# Patient Record
Sex: FEMALE | Race: WHITE | NOT HISPANIC OR LATINO | Employment: FULL TIME | URBAN - METROPOLITAN AREA
[De-identification: names, ages, dates, MRNs, and addresses within clinical notes are randomized per-mention and may not be internally consistent; named-entity substitution may affect disease eponyms.]

---

## 2017-05-30 RX ORDER — NORGESTIMATE AND ETHINYL ESTRADIOL 0.25-0.035
KIT ORAL
Qty: 28 TABLET | Refills: 2 | Status: SHIPPED | OUTPATIENT
Start: 2017-05-30 | End: 2017-07-24 | Stop reason: SDUPTHER

## 2017-07-24 ENCOUNTER — OFFICE VISIT (OUTPATIENT)
Dept: OBSTETRICS AND GYNECOLOGY | Age: 27
End: 2017-07-24

## 2017-07-24 VITALS
WEIGHT: 141 LBS | HEIGHT: 66 IN | SYSTOLIC BLOOD PRESSURE: 112 MMHG | DIASTOLIC BLOOD PRESSURE: 70 MMHG | BODY MASS INDEX: 22.66 KG/M2

## 2017-07-24 DIAGNOSIS — Z01.419 ENCOUNTER FOR GYNECOLOGICAL EXAMINATION WITHOUT ABNORMAL FINDING: Primary | ICD-10-CM

## 2017-07-24 PROCEDURE — 99395 PREV VISIT EST AGE 18-39: CPT | Performed by: OBSTETRICS & GYNECOLOGY

## 2017-07-24 RX ORDER — NORGESTIMATE AND ETHINYL ESTRADIOL 0.25-0.035
1 KIT ORAL DAILY
Qty: 90 TABLET | Refills: 3 | Status: SHIPPED | OUTPATIENT
Start: 2017-07-24 | End: 2018-07-05 | Stop reason: SDUPTHER

## 2017-07-24 RX ORDER — FEXOFENADINE HCL 180 MG/1
TABLET ORAL
Refills: 2 | COMMUNITY
Start: 2017-07-18 | End: 2020-01-23

## 2017-07-24 NOTE — PROGRESS NOTES
"Subjective     Chief Complaint   Patient presents with   • Gynecologic Exam     AC       History of Present Illness    Mo Escobar is a 27 y.o.  who presents for annual exam.  [employer] at [employer]. Graduates in 1 year.   Her menses are regular every 28-30 days, lasting 4-7 days, dysmenorrhea none   Obstetric History:  OB History      Para Term  AB TAB SAB Ectopic Multiple Living    0 0 0 0 0 0 0 0 0 0         Menstrual History:     Patient's last menstrual period was 2017.         Current contraception: OCP (estrogen/progesterone)  History of abnormal Pap smear: no  Received Gardasil immunization: all 3  Perform regular self breast exam: no  Family history of uterine or ovarian cancer: yes - MGM ovarian 50, Pt's mom has neg BRCA   Family History of colon cancer: no  Family history of breast cancer: yes - MGM 50     Mammogram: not indicated.  Colonoscopy: not indicated.  DEXA: not indicated.    Exercise: moderately active  Calcium/Vitamin D: adequate intake    The following portions of the patient's history were reviewed and updated as appropriate: allergies, current medications, past family history, past medical history, past social history, past surgical history and problem list.    Review of Systems    Review of Systems   Constitutional: Negative for fatigue.   Respiratory: Negative for shortness of breath.    Gastrointestinal: Negative for abdominal pain.   Genitourinary: Negative for dysuria.   Neurological: Negative for headaches.   Psychiatric/Behavioral: Negative for dysphoric mood.         Objective   Physical Exam    /70  Ht 66\" (167.6 cm)  Wt 141 lb (64 kg)  LMP 2017  BMI 22.76 kg/m2    General:   alert, appears stated age and cooperative   Neck: thyroid normal to palpation   Heart: regular rate and rhythm   Lungs: clear to auscultation bilaterally   Abdomen: soft, non-tender, without masses or organomegaly   Breast: inspection negative, no nipple discharge " or bleeding, no masses or nodularity palpable   Vulva: normal, Bartholin's, Urethra, [place]'s normal   Vagina: normal mucosa, normal discharge   Cervix: no cervical motion tenderness and no lesions   Uterus: mobile, non-tender, normal shape and consistency   Adnexa: no mass, fullness, tenderness   Rectal: not indicated     Assessment/Plan   Mo was seen today for gynecologic exam.    Diagnoses and all orders for this visit:    Encounter for gynecological examination without abnormal finding    Other orders  -     norgestimate-ethinyl estradiol (MONONESSA) 0.25-35 MG-MCG per tablet; Take 1 tablet by mouth Daily.      All questions answered.  Breast self exam technique reviewed and patient encouraged to perform self-exam monthly.  Discussed healthy lifestyle modifications.  Recommended 30 minutes of aerobic exercise five times per week.  Discussed calcium needs to prevent osteoporosis.

## 2017-07-26 LAB
CONV .: NORMAL
CYTOLOGIST CVX/VAG CYTO: NORMAL
CYTOLOGY CVX/VAG DOC THIN PREP: NORMAL
DX ICD CODE: NORMAL
HIV 1 & 2 AB SER-IMP: NORMAL
OTHER STN SPEC: NORMAL
PATH REPORT.FINAL DX SPEC: NORMAL
STAT OF ADQ CVX/VAG CYTO-IMP: NORMAL

## 2017-07-27 ENCOUNTER — TELEPHONE (OUTPATIENT)
Dept: OBSTETRICS AND GYNECOLOGY | Age: 27
End: 2017-07-27

## 2018-07-06 RX ORDER — NORGESTIMATE AND ETHINYL ESTRADIOL 0.25-0.035
1 KIT ORAL DAILY
Qty: 84 TABLET | Refills: 0 | Status: SHIPPED | OUTPATIENT
Start: 2018-07-06 | End: 2018-09-17 | Stop reason: SDUPTHER

## 2018-09-17 RX ORDER — NORGESTIMATE AND ETHINYL ESTRADIOL 0.25-0.035
1 KIT ORAL DAILY
Qty: 84 TABLET | Refills: 0 | Status: SHIPPED | OUTPATIENT
Start: 2018-09-17 | End: 2018-12-11 | Stop reason: SDUPTHER

## 2018-12-11 RX ORDER — NORGESTIMATE AND ETHINYL ESTRADIOL 0.25-0.035
KIT ORAL
Qty: 84 TABLET | Refills: 0 | Status: SHIPPED | OUTPATIENT
Start: 2018-12-11 | End: 2018-12-14 | Stop reason: ALTCHOICE

## 2018-12-14 ENCOUNTER — OFFICE VISIT (OUTPATIENT)
Dept: OBSTETRICS AND GYNECOLOGY | Age: 28
End: 2018-12-14

## 2018-12-14 VITALS
DIASTOLIC BLOOD PRESSURE: 70 MMHG | BODY MASS INDEX: 23.95 KG/M2 | WEIGHT: 149 LBS | SYSTOLIC BLOOD PRESSURE: 108 MMHG | HEIGHT: 66 IN

## 2018-12-14 DIAGNOSIS — Z01.419 ENCOUNTER FOR GYNECOLOGICAL EXAMINATION WITHOUT ABNORMAL FINDING: Primary | ICD-10-CM

## 2018-12-14 DIAGNOSIS — Z12.4 ROUTINE CERVICAL SMEAR: ICD-10-CM

## 2018-12-14 PROBLEM — G43.909 MIGRAINE: Status: ACTIVE | Noted: 2018-12-14

## 2018-12-14 PROCEDURE — 99395 PREV VISIT EST AGE 18-39: CPT | Performed by: OBSTETRICS & GYNECOLOGY

## 2018-12-14 RX ORDER — SUMATRIPTAN 25 MG/1
TABLET, FILM COATED ORAL
Qty: 15 TABLET | Refills: 4 | Status: SHIPPED | OUTPATIENT
Start: 2018-12-14 | End: 2020-03-19

## 2018-12-14 RX ORDER — NORGESTIMATE AND ETHINYL ESTRADIOL 0.25-0.035
1 KIT ORAL DAILY
Qty: 84 TABLET | Refills: 3 | Status: CANCELLED | OUTPATIENT
Start: 2018-12-14

## 2018-12-14 RX ORDER — NORETHINDRONE ACETATE AND ETHINYL ESTRADIOL, AND FERROUS FUMARATE 1MG-20(24)
1 KIT ORAL DAILY
Qty: 28 CAPSULE | Refills: 11 | Status: SHIPPED | OUTPATIENT
Start: 2018-12-14 | End: 2019-09-27 | Stop reason: ALTCHOICE

## 2018-12-14 NOTE — PROGRESS NOTES
"Subjective     Chief Complaint   Patient presents with   • Gynecologic Exam     AC       History of Present Illness    Mo Castro is a 28 y.o.  who presents for annual exam.  She is now working as a  in each town.  She also got  in the past few months.  She is not planning on children anytime soon.  She has noticed that she has increased stress with work and has noticed migraines when she is on her menses.    Her menses are regular every 28-30 days, lasting 4-7 days, dysmenorrhea mild, occurring first 1-2 days of flow   Obstetric History:  OB History      Para Term  AB Living    0 0 0 0 0 0    SAB TAB Ectopic Molar Multiple Live Births    0 0 0   0           Menstrual History:     Patient's last menstrual period was 2018.         Current contraception: OCP (estrogen/progesterone)  History of abnormal Pap smear: no  Received Gardasil immunization: yes  Perform regular self breast exam: no  Family history of uterine or ovarian cancer: yes - ovarian MGM 50 Pt's mom  Genetic testing was negative   Family History of colon cancer: no  Family history of breast cancer: yes - MGM 50     Mammogram: not indicated.  Colonoscopy: not indicated.  DEXA: not indicated.    Exercise: exercises 7 times a week  Calcium/Vitamin D: inadequate intake    The following portions of the patient's history were reviewed and updated as appropriate: allergies, current medications, past family history, past medical history, past social history, past surgical history and problem list.    Review of Systems    Review of Systems   Constitutional: Negative for fatigue.   Respiratory: Negative for shortness of breath.    Gastrointestinal: Negative for abdominal pain.   Genitourinary: Negative for dysuria.   Neurological: Negative for headaches.   Psychiatric/Behavioral: Negative for dysphoric mood.         Objective   Physical Exam    /70   Ht 167.6 cm (66\")   Wt 67.6 kg (149 lb)   LMP 2018  "  BMI 24.05 kg/m²     General:   alert, appears stated age and cooperative   Neck: thyroid normal to palpation   Heart: regular rate and rhythm   Lungs: clear to auscultation bilaterally   Abdomen: soft, non-tender, without masses or organomegaly   Breast: inspection negative, no nipple discharge or bleeding, no masses or nodularity palpable   Vulva: normal, Bartholin's, Urethra, [place]'s normal   Vagina: normal mucosa, normal discharge   Cervix: no cervical motion tenderness and no lesions   Uterus: mobile, non-tender, normal shape and consistency   Adnexa: no mass, fullness, tenderness   Rectal: not indicated     Assessment/Plan   Mo was seen today for gynecologic exam.    Diagnoses and all orders for this visit:    Encounter for gynecological examination without abnormal finding  -     IGP,rfx Aptima HPV All Pth    Routine cervical smear  -     IGP,rfx Aptima HPV All Pth    Other orders  -     Cancel: norgestimate-ethinyl estradiol (ESTARYLLA) 0.25-35 MG-MCG per tablet; Take 1 tablet by mouth Daily.  -     TAYTULLA 1-20 MG-MCG(24) capsule; Take 1 capsule by mouth Daily.  -     SUMAtriptan (IMITREX) 25 MG tablet; Take one tablet at onset of headache. May repeat dose one time in 2 hours if headache not relieved.      Discussed menstrual migraines.  We discussed multiple treatments.  Patient will try a change to a lower dose OCP with a 4 day placebo week.  She will also try Imitrex.  I discussed that she should consider nonestrogen containing contraception.  We also discussed stress management.  All questions answered.  Breast self exam technique reviewed and patient encouraged to perform self-exam monthly.  Discussed healthy lifestyle modifications.  Recommended 30 minutes of aerobic exercise five times per week.  Discussed calcium needs to prevent osteoporosis.

## 2018-12-18 ENCOUNTER — TELEPHONE (OUTPATIENT)
Dept: OBSTETRICS AND GYNECOLOGY | Age: 28
End: 2018-12-18

## 2018-12-18 NOTE — TELEPHONE ENCOUNTER
----- Message from Martin Orta MD sent at 12/18/2018  8:05 AM EST -----  Please notify pap is normal.

## 2019-02-19 ENCOUNTER — TELEPHONE (OUTPATIENT)
Dept: OBSTETRICS AND GYNECOLOGY | Age: 29
End: 2019-02-19

## 2019-02-19 NOTE — TELEPHONE ENCOUNTER
Pt states that ever since she started on her new BC Taytulla she has had 3 UTI's. She began taking it about a month ago. Wants to know if the BC could be contributing to that. Please advise

## 2019-02-19 NOTE — TELEPHONE ENCOUNTER
Pt has been going to urgent care for her UTI's due to her living in [facility]. The Dr at the urgent care has referred her to see a urologist. She has appt scheduled.

## 2019-02-19 NOTE — TELEPHONE ENCOUNTER
Please notify that the oral contraceptive pills are not linked to UTIs.  Recommend emptying bladder after intercourse and patient should have a culture of her urine to make sure the appropriate antibiotics are being used.

## 2019-07-13 ENCOUNTER — HOSPITAL ENCOUNTER (OUTPATIENT)
Dept: URGENT CARE | Facility: CLINIC | Age: 29
Discharge: HOME | End: 2019-07-13
Attending: PHYSICIAN ASSISTANT

## 2019-09-26 NOTE — TELEPHONE ENCOUNTER
Dr Orta pt reports not having had a mentrual period in three months. She currently is taking Taytulla bc.  Pt is concerned but not sure how she should proceed. Last Monday pt received a neg upt yet this is her third neg test.  Please advise.

## 2019-09-26 NOTE — TELEPHONE ENCOUNTER
Please notify that pills with 4 sugar pills often will cause patients to not have it.  If patient is not happy with that she can be switched to Lessina which has a 7-day placebo week and she will likely get a menses.

## 2019-09-27 RX ORDER — LEVONORGESTREL AND ETHINYL ESTRADIOL 0.1-0.02MG
1 KIT ORAL DAILY
Qty: 28 TABLET | Refills: 12 | Status: SHIPPED | OUTPATIENT
Start: 2019-09-27 | End: 2020-01-23 | Stop reason: SDUPTHER

## 2019-09-27 NOTE — TELEPHONE ENCOUNTER
Pt notified and is requesting to change her bcp & have that new prescription sent to her [facility] in [place] on file.

## 2020-01-23 ENCOUNTER — OFFICE VISIT (OUTPATIENT)
Dept: OBSTETRICS AND GYNECOLOGY | Age: 30
End: 2020-01-23

## 2020-01-23 VITALS
SYSTOLIC BLOOD PRESSURE: 108 MMHG | WEIGHT: 154 LBS | HEIGHT: 66 IN | BODY MASS INDEX: 24.75 KG/M2 | DIASTOLIC BLOOD PRESSURE: 66 MMHG

## 2020-01-23 DIAGNOSIS — Z12.4 SCREENING FOR MALIGNANT NEOPLASM OF THE CERVIX: ICD-10-CM

## 2020-01-23 DIAGNOSIS — Z31.69 ENCOUNTER FOR PRECONCEPTION CONSULTATION: ICD-10-CM

## 2020-01-23 DIAGNOSIS — Z01.419 ENCOUNTER FOR GYNECOLOGICAL EXAMINATION WITHOUT ABNORMAL FINDING: Primary | ICD-10-CM

## 2020-01-23 PROCEDURE — 99395 PREV VISIT EST AGE 18-39: CPT | Performed by: OBSTETRICS & GYNECOLOGY

## 2020-01-23 RX ORDER — MULTIPLE VITAMINS W/ MINERALS TAB 9MG-400MCG
1 TAB ORAL DAILY
COMMUNITY
End: 2021-11-15

## 2020-01-23 RX ORDER — LEVONORGESTREL AND ETHINYL ESTRADIOL 0.1-0.02MG
1 KIT ORAL DAILY
Qty: 28 TABLET | Refills: 12 | Status: SHIPPED | OUTPATIENT
Start: 2020-01-23 | End: 2021-01-22

## 2020-01-23 NOTE — PROGRESS NOTES
Subjective     Chief Complaint   Patient presents with   • Gynecologic Exam     AC       History of Present Illness    Mo Castro is a 30 y.o.  who presents for annual exam.  The patient works as a .  She is enjoying her job.  She works in [employer].  Patient does have migraines.  She feels like they are triggered by the weather and sinus congestion and not her menses.  At first she tried Taytulla but was not having menses and switch to a pill with 7 placebo pills.  She likes it much better.  Her cycles are regular.  Her mother had full genetic testing which was normal.  Her menses are regular every 28-30 days, lasting 4-7 days, dysmenorrhea none   Obstetric History:  OB History        0    Para   0    Term   0       0    AB   0    Living   0       SAB   0    TAB   0    Ectopic   0    Molar        Multiple   0    Live Births                   Menstrual History:     Patient's last menstrual period was 2020.         Current contraception: OCP (estrogen/progesterone)  History of abnormal Pap smear: no  Received Gardasil immunization: yes  Perform regular self breast exam : yes  Family history of uterine or ovarian cancer: yes - MGM ovarian 50- pt's mom genetic claudette neg  Family History of colon cancer: no  Family history of breast cancer: yes - MGM 50     Mammogram: not indicated.  Colonoscopy: not indicated.  DEXA: not indicated.    Exercise: not active  Calcium/Vitamin D: adequate intake    The following portions of the patient's history were reviewed and updated as appropriate: allergies, current medications, past family history, past medical history, past social history, past surgical history and problem list.    Review of Systems    Review of Systems   Constitutional: Negative for fatigue.   Respiratory: Negative for shortness of breath.    Gastrointestinal: Negative for abdominal pain.   Genitourinary: Negative for dysuria.   Neurological: Negative for headaches.  "  Psychiatric/Behavioral: Negative for dysphoric mood.         Objective   Physical Exam    /66   Ht 167.6 cm (66\")   Wt 69.9 kg (154 lb)   LMP 01/08/2020   Breastfeeding No   BMI 24.86 kg/m²     General:   alert, appears stated age and cooperative   Neck: thyroid normal to palpation   Heart: regular rate and rhythm   Lungs: clear to auscultation bilaterally   Abdomen: soft, non-tender, without masses or organomegaly   Breast: inspection negative, no nipple discharge or bleeding, no masses or nodularity palpable   Vulva: normal, Bartholin's, Urethra, [place]'s normal   Vagina: normal mucosa, normal discharge   Cervix: no cervical motion tenderness and no lesions   Uterus: non-tender, normal shape and consistency   Adnexa: no mass, fullness, tenderness   Rectal: not indicated     Assessment/Plan   Mo was seen today for gynecologic exam.    Diagnoses and all orders for this visit:    Encounter for gynecological examination without abnormal finding  -     IGP,rfx Aptima HPV All Pth    Encounter for preconception consultation  -     Rubella Antibody, IgG    Screening for malignant neoplasm of the cervix  -     IGP,rfx Aptima HPV All Pth    Other orders  -     levonorgestrel-ethinyl estradiol (AVIANE,ALESSE,LESSINA) 0.1-20 MG-MCG per tablet; Take 1 tablet by mouth Daily.      Patient is planning on trying for pregnancy in the fall.  We discussed full fertile times of the cycle.  We will check rubella today.  Patient has previously had chickenpox.  All questions answered.  Breast self exam technique reviewed and patient encouraged to perform self-exam monthly.  Discussed healthy lifestyle modifications.  Recommended 30 minutes of aerobic exercise five times per week.  Discussed calcium needs to prevent osteoporosis.                 "

## 2020-01-24 ENCOUNTER — TELEPHONE (OUTPATIENT)
Dept: OBSTETRICS AND GYNECOLOGY | Age: 30
End: 2020-01-24

## 2020-01-24 LAB — RUBV IGG SERPL IA-ACNC: 5.5 INDEX

## 2020-01-27 ENCOUNTER — TELEPHONE (OUTPATIENT)
Dept: OBSTETRICS AND GYNECOLOGY | Age: 30
End: 2020-01-27

## 2020-01-27 LAB
CONV .: NORMAL
CYTOLOGIST CVX/VAG CYTO: NORMAL
CYTOLOGY CVX/VAG DOC CYTO: NORMAL
CYTOLOGY CVX/VAG DOC THIN PREP: NORMAL
DX ICD CODE: NORMAL
HIV 1 & 2 AB SER-IMP: NORMAL
OTHER STN SPEC: NORMAL
STAT OF ADQ CVX/VAG CYTO-IMP: NORMAL

## 2020-01-27 NOTE — TELEPHONE ENCOUNTER
----- Message from Martin Orta MD sent at 1/27/2020  4:38 PM EST -----  Please notify pap is normal.

## 2020-01-28 ENCOUNTER — TELEPHONE (OUTPATIENT)
Dept: OBSTETRICS AND GYNECOLOGY | Age: 30
End: 2020-01-28

## 2020-01-28 NOTE — TELEPHONE ENCOUNTER
Pt called for results of labs she had done.  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 Yoon (she works under her [place] name)  Aware Dr. Orta is out of office.     # [phone]

## 2020-03-19 RX ORDER — SUMATRIPTAN 25 MG/1
TABLET, FILM COATED ORAL
Qty: 15 TABLET | Refills: 0 | Status: SHIPPED | OUTPATIENT
Start: 2020-03-19 | End: 2020-06-26

## 2020-06-26 RX ORDER — SUMATRIPTAN 25 MG/1
TABLET, FILM COATED ORAL
Qty: 15 TABLET | Refills: 0 | Status: SHIPPED | OUTPATIENT
Start: 2020-06-26 | End: 2021-11-15

## 2021-04-22 ENCOUNTER — OFFICE VISIT (OUTPATIENT)
Dept: OBSTETRICS AND GYNECOLOGY | Age: 31
End: 2021-04-22

## 2021-04-22 VITALS
SYSTOLIC BLOOD PRESSURE: 108 MMHG | BODY MASS INDEX: 23.3 KG/M2 | WEIGHT: 145 LBS | DIASTOLIC BLOOD PRESSURE: 66 MMHG | HEIGHT: 66 IN

## 2021-04-22 DIAGNOSIS — Z01.419 ENCOUNTER FOR GYNECOLOGICAL EXAMINATION WITHOUT ABNORMAL FINDING: Primary | ICD-10-CM

## 2021-04-22 PROCEDURE — 99395 PREV VISIT EST AGE 18-39: CPT | Performed by: OBSTETRICS & GYNECOLOGY

## 2021-04-22 RX ORDER — UREA 10 %
LOTION (ML) TOPICAL
COMMUNITY
End: 2021-11-15

## 2021-04-22 RX ORDER — FOLIC ACID 1 MG/1
1 TABLET ORAL DAILY
COMMUNITY
End: 2021-11-15

## 2021-04-22 NOTE — PROGRESS NOTES
Subjective     Chief Complaint   Patient presents with   • Gynecologic Exam     AC, last pap 2020 normal.       History of Present Illness    Mo Castro is a 31 y.o.  who presents for annual exam.  Patient is working as a .  She changed jobs and that has made a big difference in her stress.  She was previously working in [place] was working long 12-hour days.  She is now working in [place] and has more reasonable schedule.  She has been trying for pregnancy for about 4 months.  She is ovulating regularly.  Her headaches have improved since she changed jobs.  Her rubella IgG was 5 will be checked last year.  She is previously had chickenpox.  She does not want to do carrier testing.  Her menses are regular every 25-29 days, lasting 3-4 days, dysmenorrhea mild, occurring first 1-2 days of flow pos kits   Obstetric History:  OB History        0    Para   0    Term   0       0    AB   0    Living   0       SAB   0    TAB   0    Ectopic   0    Molar        Multiple   0    Live Births                   Menstrual History:     Patient's last menstrual period was 2021.         Current contraception: none TFP since    History of abnormal Pap smear: no  Received Gardasil immunization: yes  Perform regular self breast exam : yes  Family history of uterine or ovarian cancer: yes - MGM ovarian 50  Family History of colon cancer: no  Family history of breast cancer: yes - MGM 50 - mom genetic testing neg     Mammogram: not indicated.  Colonoscopy: not indicated.  DEXA: not indicated.    Exercise: moderately active  Calcium/Vitamin D: adequate intake    The following portions of the patient's history were reviewed and updated as appropriate: allergies, current medications, past family history, past medical history, past social history, past surgical history and problem list.    Review of Systems    Review of Systems   Constitutional: Negative for fatigue.   Respiratory:  "Negative for shortness of breath.    Gastrointestinal: Negative for abdominal pain.   Genitourinary: Negative for dysuria.   Neurological: Negative for headaches.   Psychiatric/Behavioral: Negative for dysphoric mood.     Objective   Physical Exam    /66   Ht 167.6 cm (66\")   Wt 65.8 kg (145 lb)   LMP 04/21/2021   Breastfeeding No   BMI 23.40 kg/m²     General:   alert, appears stated age and cooperative   Neck: thyroid normal to palpation   Heart: regular rate and rhythm   Lungs: clear to auscultation bilaterally   Abdomen: soft, non-tender, without masses or organomegaly   Breast: inspection negative, no nipple discharge or bleeding, no masses or nodularity palpable   Vulva: normal, Bartholin's, Urethra, [place]'s normal   Vagina: normal mucosa, normal discharge   Cervix: no cervical motion tenderness and no lesions   Uterus: non-tender, normal shape and consistency   Adnexa: no mass, fullness, tenderness   Rectal: not indicated     Assessment/Plan   Diagnoses and all orders for this visit:    1. Encounter for gynecological examination without abnormal finding (Primary)    Patient will continue to try for pregnancy and call if she has a positive pregnancy test.  If not she will come back in January to discuss.  She will continue vitamins.    All questions answered.  Breast self exam technique reviewed and patient encouraged to perform self-exam monthly.  Discussed healthy lifestyle modifications.  Recommended 30 minutes of aerobic exercise five times per week.  Discussed calcium needs to prevent osteoporosis.                     "

## 2021-08-09 ENCOUNTER — OFFICE VISIT (OUTPATIENT)
Dept: FAMILY MEDICINE CLINIC | Age: 31
End: 2021-08-09
Payer: COMMERCIAL

## 2021-08-09 VITALS
HEIGHT: 66 IN | WEIGHT: 146.4 LBS | RESPIRATION RATE: 13 BRPM | BODY MASS INDEX: 23.53 KG/M2 | DIASTOLIC BLOOD PRESSURE: 78 MMHG | HEART RATE: 107 BPM | OXYGEN SATURATION: 97 % | SYSTOLIC BLOOD PRESSURE: 105 MMHG | TEMPERATURE: 98.6 F

## 2021-08-09 DIAGNOSIS — N92.6 ABNORMAL MENSTRUAL CYCLE: Primary | ICD-10-CM

## 2021-08-09 PROCEDURE — 99204 OFFICE O/P NEW MOD 45 MIN: CPT | Performed by: FAMILY MEDICINE

## 2021-08-09 RX ORDER — SUMATRIPTAN 25 MG/1
25 TABLET, FILM COATED ORAL
COMMUNITY

## 2021-08-09 RX ORDER — UREA 10 %
2.5 LOTION (ML) TOPICAL DAILY
COMMUNITY

## 2021-08-09 RX ORDER — MULTIPLE VITAMINS W/ MINERALS TAB 9MG-400MCG
1 TAB ORAL DAILY
COMMUNITY

## 2021-08-09 RX ORDER — FOLIC ACID 1 MG/1
1 TABLET ORAL DAILY
COMMUNITY

## 2021-08-09 ASSESSMENT — PATIENT HEALTH QUESTIONNAIRE - PHQ9
1. LITTLE INTEREST OR PLEASURE IN DOING THINGS: 0
SUM OF ALL RESPONSES TO PHQ QUESTIONS 1-9: 0
SUM OF ALL RESPONSES TO PHQ QUESTIONS 1-9: 0
2. FEELING DOWN, DEPRESSED OR HOPELESS: 0
SUM OF ALL RESPONSES TO PHQ QUESTIONS 1-9: 0
SUM OF ALL RESPONSES TO PHQ9 QUESTIONS 1 & 2: 0

## 2021-08-09 NOTE — PATIENT INSTRUCTIONS
Check labs day 3, 4 or 5 of your cycle    [email or web address]    Rosemary Hobson      SEMINAL FLUID COLLECTION INSTRUCTIONS    1) First obtain seminal fluid collection kit using your prescription    2) Avoid sexual intercourse for at least 4 days prior to collection of specimen    3) Label plastic container with full name and collection date and time    4) Using the seminal fluid collection device provided, use a small sterile needle (available at pharmacies) to make a hole in the condom. Collect the specimen during a natural act of genital intercourse. Be sure to leave extra space at the end to allow for collection of semen    5) Empty the contents of the device as completely as possible into the plastic container. Do not touch any of the seminal fluid itself as it may contaminate the specimen and alter the results. 6) Discard the empty SFC device. Do not include it with the specimen    7) Place the specimen container and lab requisition form in the sack provided, making sure it contains patient's name, doctor, collection date, and time. Keep at room temp or as close as possible to body temp. 8) Deliver directly to the lab within 30 minutes. If this is not possible, deliver as soon as possible, for time is very critical to the analysis of the specimen. 9) Inform the laboratory  that this specimen is for a \"seminal fluid analysis\" so that he/she will promptly take care of the specimen. The following labs can accept the specimen. Other labs may be available, you can check with your insurance company regarding this. Please call the lab prior to collection to find out their procedure and hours for analysis. Be sure the lab has our clinic information and fax number so that they can send us the results. Please call if we have not contacted you with results within 5 days of the specimen being submitted.

## 2021-08-09 NOTE — PROGRESS NOTES
2021    Darrin Potter is a 36 y.o. female here to establish care with me. Previously seen by Dr. Raven Viera ([address], [place])  Works as a .  works for the [address], [employer]     [employer]    - , last seen by OB/Gyn in 2021  - Trying for pregnancy since around the end of .  - Ovulating regularly. Regular menses (25-29 days), lasting 3-4 days, mild cramping on first 1-2 days  - Met  in [place]    Previously on OCP for a few years until 2020 then started trying to conceive  - early , started LH strips and other forms of monitoring (early morning temp) to monitor days of fertility  - no significant PMS changes    She is here to discuss different treatment options and work-up related to difficulty conceiving over the past 8 to 9 months    Review of Systems    Prior to Visit Medications    Medication Sig Taking? Authorizing Provider   folic acid (FOLVITE) 1 MG tablet Take 1 mg by mouth daily Yes Historical Provider, MD   melatonin 1 MG tablet Take 2.5 mg by mouth daily At bedtime Yes Historical Provider, MD   Multiple Vitamins-Minerals (THERA M PLUS) TABS Take 1 tablet by mouth daily Yes Historical Provider, MD   SUMAtriptan (IMITREX) 25 MG tablet Take 25 mg by mouth once as needed for Migraine Yes Historical Provider, MD     History reviewed. No pertinent past medical history. No past surgical history on file. No family history on file. Social History     Socioeconomic History    Marital status: Unknown     Spouse name: None    Number of children: None    Years of education: None    Highest education level: None   Occupational History    None   Tobacco Use    Smoking status: Never Smoker    Smokeless tobacco: Never Used   Substance and Sexual Activity    Alcohol use:  Yes     Alcohol/week: 1.0 standard drinks     Types: 1 Glasses of wine per week    Drug use: Never    Sexual activity: None   Other Topics Concern    None Social History Narrative    None     Social Determinants of Health     Financial Resource Strain:     Difficulty of Paying Living Expenses:    Food Insecurity:     Worried About Running Out of Food in the Last Year:     [address] in the Last Year:    Transportation Needs:     Lack of Transportation (Medical):  Lack of Transportation (Non-Medical):    Physical Activity:     Days of Exercise per Week:     Minutes of Exercise per Session:    Stress:     Feeling of Stress :    Social Connections:     Frequency of Communication with Friends and Family:     Frequency of Social Gatherings with Friends and Family:     Attends Rastafari Services:     Active Member of Clubs or Organizations:     Attends Club or Organization Meetings:     Marital Status:    Intimate Partner Violence:     Fear of Current or Ex-Partner:     Emotionally Abused:     Physically Abused:     Sexually Abused: Allergies   Allergen Reactions    Amoxicillin Hives          PHYSICAL EXAM  /78 (Site: Left Upper Arm, Position: Sitting, Cuff Size: Medium Adult)   Pulse 107   Temp 98.6 °F (37 °C)   Resp 13   Ht 5' 5.95\" (1.675 m)   Wt 146 lb 6.4 oz (66.4 kg)   LMP 08/07/2021 (Exact Date)   SpO2 97%   BMI 23.67 kg/m²     BP Readings from Last 3 Encounters:   08/09/21 105/78       Wt Readings from Last 3 Encounters:   08/09/21 146 lb 6.4 oz (66.4 kg)        Physical Exam    ASSESSMENT/PLAN:  1. Abnormal menstrual cycle  - 17-Hydroxyprogesterone; Future  - Androstenedione; Future  - Cortisol AM, Total; Future  - DHEA-Sulfate; Future  - Estradiol; Future  - Follicle Stimulating Hormone; Future  - Progesterone; Future  - Prolactin; Future  - Testosterone, free, total; Future  - TSH with Reflex; Future  - Insulin Free & Total; Future  - Glucose, Fasting; Future  - US NON OB TRANSVAGINAL; Future    Claudette Stack is a 68-year-old female and she and her  have been unable to conceive over the past 9 months.   She has been

## 2021-08-31 DIAGNOSIS — N92.6 ABNORMAL MENSTRUAL CYCLE: ICD-10-CM

## 2021-11-15 ENCOUNTER — INITIAL PRENATAL (OUTPATIENT)
Dept: OBSTETRICS AND GYNECOLOGY | Age: 31
End: 2021-11-15

## 2021-11-15 VITALS — WEIGHT: 139 LBS | SYSTOLIC BLOOD PRESSURE: 108 MMHG | DIASTOLIC BLOOD PRESSURE: 74 MMHG | BODY MASS INDEX: 22.44 KG/M2

## 2021-11-15 DIAGNOSIS — Z11.3 SCREEN FOR SEXUALLY TRANSMITTED DISEASES: ICD-10-CM

## 2021-11-15 DIAGNOSIS — Z34.00 SUPERVISION OF NORMAL FIRST PREGNANCY, ANTEPARTUM: ICD-10-CM

## 2021-11-15 DIAGNOSIS — Z13.89 SCREENING FOR BLOOD OR PROTEIN IN URINE: Primary | ICD-10-CM

## 2021-11-15 LAB
GLUCOSE UR STRIP-MCNC: NEGATIVE MG/DL
PROT UR STRIP-MCNC: NEGATIVE MG/DL
VZV IGG SER QL: NORMAL

## 2021-11-15 PROCEDURE — 0501F PRENATAL FLOW SHEET: CPT | Performed by: OBSTETRICS & GYNECOLOGY

## 2021-11-15 RX ORDER — PROMETHAZINE HYDROCHLORIDE 25 MG/1
25 TABLET ORAL EVERY 6 HOURS PRN
Qty: 25 TABLET | Refills: 0 | Status: SHIPPED | OUTPATIENT
Start: 2021-11-15 | End: 2022-06-10

## 2021-11-15 RX ORDER — DOCONEXENT, NIACINAMIDE, .ALPHA.-TOCOPHEROL ACETATE, DL-, CHOLECALCIFEROL, .BETA.-CAROTENE, ASCORBIC ACID, THIAMINE MONONITRATE, RIBOFLAVIN, PYRIDOXINE HYDROCHLORIDE, CYANOCOBALAMIN, IRON, ZINC OXIDE, CUPRIC OXIDE, POTASSIUM IODIDE, MAGNESIUM OXIDE, FOLIC ACID, AND LEVOMEFOLATE CALCIUM 200; 15; 20; 1000; 1100; 30; 1.6; 1.8; 2.5; 12; 29; 25; 2; 150; 20; .4; .6 MG/1; MG/1; [IU]/1; [IU]/1; [IU]/1; MG/1; MG/1; MG/1; MG/1; UG/1; MG/1; MG/1; MG/1; UG/1; MG/1; MG/1; MG/1
200 CAPSULE, LIQUID FILLED ORAL DAILY
Qty: 30 CAPSULE | Refills: 11 | Status: SHIPPED | OUTPATIENT
Start: 2021-11-15

## 2021-11-15 NOTE — PROGRESS NOTES
Patient is a 31-year-old  1 para 0 at 11 weeks by ultrasound.  Patient has shorter cycles.  Her LMP is within a day or 2 but differs by 5 days from the ultrasound due date.  Patient is here today with her  Brian.  Patient works as a .  She does get headaches.  She is taking a prenatal vitamin.    Full history is reviewed.    Exam-see exam tab  Ultrasound is reviewed and dates changed.    Assessment-11 weeks  New OB information was reviewed in detail  Patient declines amniocentesis but would like to do cell free DNA and carrier testing today.  Patient has a penicillin allergy but can take cephalosporins  Patient desires flu vaccination today she has had her Covid vaccination.

## 2021-11-16 LAB
ABO GROUP BLD: NORMAL
BASOPHILS # BLD AUTO: 0 X10E3/UL (ref 0–0.2)
BASOPHILS NFR BLD AUTO: 1 %
BLD GP AB SCN SERPL QL: NEGATIVE
EOSINOPHIL # BLD AUTO: 0 X10E3/UL (ref 0–0.4)
EOSINOPHIL NFR BLD AUTO: 0 %
ERYTHROCYTE [DISTWIDTH] IN BLOOD BY AUTOMATED COUNT: 11.7 % (ref 11.7–15.4)
HBV SURFACE AG SERPL QL IA: NEGATIVE
HCT VFR BLD AUTO: 39.5 % (ref 34–46.6)
HCV AB S/CO SERPL IA: <0.1 S/CO RATIO (ref 0–0.9)
HGB BLD-MCNC: 13.1 G/DL (ref 11.1–15.9)
HIV 1+2 AB+HIV1 P24 AG SERPL QL IA: NON REACTIVE
IMM GRANULOCYTES # BLD AUTO: 0 X10E3/UL (ref 0–0.1)
IMM GRANULOCYTES NFR BLD AUTO: 0 %
LABORATORY COMMENT REPORT: NORMAL
LYMPHOCYTES # BLD AUTO: 1.9 X10E3/UL (ref 0.7–3.1)
LYMPHOCYTES NFR BLD AUTO: 24 %
MCH RBC QN AUTO: 31 PG (ref 26.6–33)
MCHC RBC AUTO-ENTMCNC: 33.2 G/DL (ref 31.5–35.7)
MCV RBC AUTO: 93 FL (ref 79–97)
MONOCYTES # BLD AUTO: 0.4 X10E3/UL (ref 0.1–0.9)
MONOCYTES NFR BLD AUTO: 5 %
NEUTROPHILS # BLD AUTO: 5.8 X10E3/UL (ref 1.4–7)
NEUTROPHILS NFR BLD AUTO: 70 %
PLATELET # BLD AUTO: 259 X10E3/UL (ref 150–450)
RBC # BLD AUTO: 4.23 X10E6/UL (ref 3.77–5.28)
RH BLD: POSITIVE
RPR SER QL: NON REACTIVE
RUBV IGG SERPL IA-ACNC: 4.23 INDEX
T GONDII IGG SERPL IA-ACNC: <3 IU/ML (ref 0–7.1)
T GONDII IGM SER IA-ACNC: <3 AU/ML (ref 0–7.9)
WBC # BLD AUTO: 8.2 X10E3/UL (ref 3.4–10.8)

## 2021-11-17 ENCOUNTER — TELEPHONE (OUTPATIENT)
Dept: OBSTETRICS AND GYNECOLOGY | Age: 31
End: 2021-11-17

## 2021-11-17 LAB
BACTERIA UR CULT: NO GROWTH
BACTERIA UR CULT: NORMAL
C TRACH RRNA SPEC QL NAA+PROBE: NEGATIVE
N GONORRHOEA RRNA SPEC QL NAA+PROBE: NEGATIVE

## 2021-12-13 ENCOUNTER — ROUTINE PRENATAL (OUTPATIENT)
Dept: OBSTETRICS AND GYNECOLOGY | Age: 31
End: 2021-12-13

## 2021-12-13 VITALS — DIASTOLIC BLOOD PRESSURE: 68 MMHG | SYSTOLIC BLOOD PRESSURE: 112 MMHG | WEIGHT: 140 LBS | BODY MASS INDEX: 22.6 KG/M2

## 2021-12-13 DIAGNOSIS — Z34.00 SUPERVISION OF NORMAL FIRST PREGNANCY, ANTEPARTUM: ICD-10-CM

## 2021-12-13 DIAGNOSIS — Z3A.15 15 WEEKS GESTATION OF PREGNANCY: ICD-10-CM

## 2021-12-13 DIAGNOSIS — Z13.89 SCREENING FOR BLOOD OR PROTEIN IN URINE: Primary | ICD-10-CM

## 2021-12-13 DIAGNOSIS — Z23 FLU VACCINE NEED: ICD-10-CM

## 2021-12-13 LAB
GLUCOSE UR STRIP-MCNC: NEGATIVE MG/DL
PROT UR STRIP-MCNC: NEGATIVE MG/DL

## 2021-12-13 PROCEDURE — 90471 IMMUNIZATION ADMIN: CPT | Performed by: PHYSICIAN ASSISTANT

## 2021-12-13 PROCEDURE — 0502F SUBSEQUENT PRENATAL CARE: CPT | Performed by: PHYSICIAN ASSISTANT

## 2021-12-13 PROCEDURE — 90686 IIV4 VACC NO PRSV 0.5 ML IM: CPT | Performed by: PHYSICIAN ASSISTANT

## 2021-12-13 RX ORDER — FLUTICASONE PROPIONATE 50 MCG
1 SPRAY, SUSPENSION (ML) NASAL DAILY
COMMUNITY
Start: 2021-11-29 | End: 2022-05-14

## 2021-12-13 NOTE — PROGRESS NOTES
Chief Complaint   Patient presents with   • Routine Prenatal Visit       HPI: 31 y.o.  at 15w0d gestation  She is doing great  Has no c/o  Flu vaccine today, didn't receive at last visit  FHT noted with handheld doppler  Call for any issues  Plan f/u in 4 wks for anatomy scan      Vitals:    21 1002   BP: 112/68   Weight: 63.5 kg (140 lb)       ROS:  GI:  Negative  : na  Pulmonary: Negative     A/P  1. Intrauterine pregnancy at 15w0d   2. Pregnancy Risk:  NORMAL    Diagnoses and all orders for this visit:    1. Screening for blood or protein in urine (Primary)  -     POC Urinalysis Dipstick, Multipro    2. 15 weeks gestation of pregnancy  -     POC Urinalysis Dipstick, Multipro    3. Supervision of normal first pregnancy, antepartum        -----------------------  PLAN:   Return in about 4 weeks (around 1/10/2022) for will do anatomy scan.      DAREN Longo  2021 10:38 EST

## 2022-01-10 ENCOUNTER — ROUTINE PRENATAL (OUTPATIENT)
Dept: OBSTETRICS AND GYNECOLOGY | Age: 32
End: 2022-01-10

## 2022-01-10 VITALS — DIASTOLIC BLOOD PRESSURE: 62 MMHG | BODY MASS INDEX: 22.92 KG/M2 | SYSTOLIC BLOOD PRESSURE: 104 MMHG | WEIGHT: 142 LBS

## 2022-01-10 DIAGNOSIS — Z34.00 SUPERVISION OF NORMAL FIRST PREGNANCY, ANTEPARTUM: ICD-10-CM

## 2022-01-10 DIAGNOSIS — Z13.89 SCREENING FOR BLOOD OR PROTEIN IN URINE: Primary | ICD-10-CM

## 2022-01-10 LAB
GLUCOSE UR STRIP-MCNC: NEGATIVE MG/DL
PROT UR STRIP-MCNC: NEGATIVE MG/DL

## 2022-01-10 PROCEDURE — 0502F SUBSEQUENT PRENATAL CARE: CPT | Performed by: OBSTETRICS & GYNECOLOGY

## 2022-01-10 NOTE — PROGRESS NOTES
Patient has no complaints.  She is here today for anatomy ultrasound.  She had her flu vaccine at last visit.    Anatomy ultrasound shows normal anatomy within the limits of ultrasound.  Size equal to dates.  Cervical length is normal.  Placenta is anterior with no previa.  Baby is vertex.  Baby is a girl  3 pound weight gain only for pregnancy  Blood pressure 104/62 with no protein.    Assessment-19 weeks  Check AFP today.  Follow-up in 4 weeks.

## 2022-01-12 ENCOUNTER — TELEPHONE (OUTPATIENT)
Dept: OBSTETRICS AND GYNECOLOGY | Age: 32
End: 2022-01-12

## 2022-01-12 LAB
AFP ADJ MOM SERPL: 1.34
AFP INTERP SERPL-IMP: NORMAL
AFP INTERP SERPL-IMP: NORMAL
AFP SERPL-MCNC: 69.6 NG/ML
AGE AT DELIVERY: 30.3 YR
GA METHOD: NORMAL
GA: 19 WEEKS
IDDM PATIENT QL: NO
LABORATORY COMMENT REPORT: NORMAL
MULTIPLE PREGNANCY: NO
NEURAL TUBE DEFECT RISK FETUS: 4273 %
RESULT: NORMAL

## 2022-01-12 NOTE — TELEPHONE ENCOUNTER
GLENNI pt calls stating she tested positive for covid19 today and was in the office on 01/10. PT is doing well so far, symptoms onset yesterday.

## 2022-01-13 NOTE — TELEPHONE ENCOUNTER
Please have patient push fluids and take Tylenol.  She should go to the hospital if she has shortness of breath.

## 2022-02-07 ENCOUNTER — ROUTINE PRENATAL (OUTPATIENT)
Dept: OBSTETRICS AND GYNECOLOGY | Age: 32
End: 2022-02-07

## 2022-02-07 VITALS — SYSTOLIC BLOOD PRESSURE: 108 MMHG | WEIGHT: 148 LBS | DIASTOLIC BLOOD PRESSURE: 64 MMHG | BODY MASS INDEX: 23.89 KG/M2

## 2022-02-07 DIAGNOSIS — Z34.00 SUPERVISION OF NORMAL FIRST PREGNANCY, ANTEPARTUM: ICD-10-CM

## 2022-02-07 DIAGNOSIS — Z13.89 SCREENING FOR BLOOD OR PROTEIN IN URINE: Primary | ICD-10-CM

## 2022-02-07 LAB
GLUCOSE UR STRIP-MCNC: NEGATIVE MG/DL
PROT UR STRIP-MCNC: NEGATIVE MG/DL

## 2022-02-07 PROCEDURE — 0502F SUBSEQUENT PRENATAL CARE: CPT | Performed by: OBSTETRICS & GYNECOLOGY

## 2022-02-07 NOTE — PROGRESS NOTES
She is doing well.  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  She is feeling fetal movements.    Weight gain for pregnancy is low at 9 pounds  Blood pressure 108/64 with no protein  Doppler heart tones are positive and fundal height is appropriate.  AFP was normal.    Assessment-23 weeks  Encourage increased snacks and weight gain  Follow-up in 4 weeks for third trimester labs

## 2022-03-07 ENCOUNTER — ROUTINE PRENATAL (OUTPATIENT)
Dept: OBSTETRICS AND GYNECOLOGY | Age: 32
End: 2022-03-07

## 2022-03-07 VITALS — SYSTOLIC BLOOD PRESSURE: 108 MMHG | DIASTOLIC BLOOD PRESSURE: 74 MMHG | WEIGHT: 154 LBS | BODY MASS INDEX: 24.86 KG/M2

## 2022-03-07 DIAGNOSIS — Z13.89 SCREENING FOR BLOOD OR PROTEIN IN URINE: ICD-10-CM

## 2022-03-07 DIAGNOSIS — Z13.0 SCREENING FOR IRON DEFICIENCY ANEMIA: ICD-10-CM

## 2022-03-07 DIAGNOSIS — Z34.00 SUPERVISION OF NORMAL FIRST PREGNANCY, ANTEPARTUM: Primary | ICD-10-CM

## 2022-03-07 DIAGNOSIS — Z13.1 SCREENING FOR DIABETES MELLITUS: ICD-10-CM

## 2022-03-07 LAB
GLUCOSE UR STRIP-MCNC: NEGATIVE MG/DL
PROT UR STRIP-MCNC: NEGATIVE MG/DL

## 2022-03-07 PROCEDURE — 90715 TDAP VACCINE 7 YRS/> IM: CPT | Performed by: OBSTETRICS & GYNECOLOGY

## 2022-03-07 PROCEDURE — 0502F SUBSEQUENT PRENATAL CARE: CPT | Performed by: OBSTETRICS & GYNECOLOGY

## 2022-03-07 PROCEDURE — 90471 IMMUNIZATION ADMIN: CPT | Performed by: OBSTETRICS & GYNECOLOGY

## 2022-03-07 NOTE — PROGRESS NOTES
Patient is feeling well.  Her schedule is a little bit better at work since they got another .  She is feeling good fetal movements.  No complaints.    Weight gain is improving  Blood pressure 108 or 74 no protein  Doppler tones are positive and fundal height is appropriate    Assessment-27 weeks  Third trimester labs today.  Tdap given today.  Her  is already had his.  Blood type is O+  Patient will bring her pediatrician name at next visit.

## 2022-03-08 LAB
ERYTHROCYTE [DISTWIDTH] IN BLOOD BY AUTOMATED COUNT: 11.9 % (ref 11.7–15.4)
GLUCOSE 1H P 50 G GLC PO SERPL-MCNC: 99 MG/DL (ref 65–139)
HCT VFR BLD AUTO: 36.9 % (ref 34–46.6)
HGB BLD-MCNC: 12.5 G/DL (ref 11.1–15.9)
MCH RBC QN AUTO: 32.1 PG (ref 26.6–33)
MCHC RBC AUTO-ENTMCNC: 33.9 G/DL (ref 31.5–35.7)
MCV RBC AUTO: 95 FL (ref 79–97)
PLATELET # BLD AUTO: 221 X10E3/UL (ref 150–450)
RBC # BLD AUTO: 3.9 X10E6/UL (ref 3.77–5.28)
WBC # BLD AUTO: 8.1 X10E3/UL (ref 3.4–10.8)

## 2022-03-28 ENCOUNTER — ROUTINE PRENATAL (OUTPATIENT)
Dept: OBSTETRICS AND GYNECOLOGY | Age: 32
End: 2022-03-28

## 2022-03-28 VITALS — SYSTOLIC BLOOD PRESSURE: 112 MMHG | BODY MASS INDEX: 25.34 KG/M2 | WEIGHT: 157 LBS | DIASTOLIC BLOOD PRESSURE: 72 MMHG

## 2022-03-28 DIAGNOSIS — Z34.00 SUPERVISION OF NORMAL FIRST PREGNANCY, ANTEPARTUM: ICD-10-CM

## 2022-03-28 DIAGNOSIS — Z13.89 SCREENING FOR BLOOD OR PROTEIN IN URINE: Primary | ICD-10-CM

## 2022-03-28 DIAGNOSIS — Z3A.30 30 WEEKS GESTATION OF PREGNANCY: ICD-10-CM

## 2022-03-28 LAB
GLUCOSE UR STRIP-MCNC: NEGATIVE MG/DL
PROT UR STRIP-MCNC: NEGATIVE MG/DL

## 2022-03-28 PROCEDURE — 0502F SUBSEQUENT PRENATAL CARE: CPT | Performed by: PHYSICIAN ASSISTANT

## 2022-03-28 NOTE — PROGRESS NOTES
Chief Complaint   Patient presents with   • Routine Prenatal Visit       HPI: 32 y.o.  at 30w0d gestation  She is doing well  Has good FM  Staying busy at work, [employer]  No c/o  Did ask about lactation consult prior to delivery, not required, optional, will get consult in hospital  Has pediatrician name, posted in chart, Nichole Polo MD  Has f/u appt in 2 wks with u/s to check EFW, with me    Vitals:    22 1119   BP: 112/72   Weight: 71.2 kg (157 lb)       ROS:  GI:  Negative  : na  Pulmonary: Negative     A/P  1. Intrauterine pregnancy at 30w0d   2. Pregnancy Risk:  NORMAL    Diagnoses and all orders for this visit:    1. Screening for blood or protein in urine (Primary)  -     POC Urinalysis Dipstick    2. Supervision of normal first pregnancy, antepartum    3. 30 weeks gestation of pregnancy        -----------------------  PLAN:   Return in about 2 weeks (around 2022) for belly check.      DAREN Longo  3/28/2022 11:43 EDT

## 2022-04-11 ENCOUNTER — ROUTINE PRENATAL (OUTPATIENT)
Dept: OBSTETRICS AND GYNECOLOGY | Age: 32
End: 2022-04-11

## 2022-04-11 VITALS — DIASTOLIC BLOOD PRESSURE: 68 MMHG | BODY MASS INDEX: 25.7 KG/M2 | WEIGHT: 159.2 LBS | SYSTOLIC BLOOD PRESSURE: 118 MMHG

## 2022-04-11 DIAGNOSIS — O26.13 LOW MATERNAL WEIGHT GAIN, THIRD TRIMESTER: ICD-10-CM

## 2022-04-11 DIAGNOSIS — Z3A.32 32 WEEKS GESTATION OF PREGNANCY: Primary | ICD-10-CM

## 2022-04-11 DIAGNOSIS — Z13.89 SCREENING FOR BLOOD OR PROTEIN IN URINE: ICD-10-CM

## 2022-04-11 LAB
GLUCOSE UR STRIP-MCNC: NEGATIVE MG/DL
PROT UR STRIP-MCNC: NEGATIVE MG/DL

## 2022-04-11 PROCEDURE — 0502F SUBSEQUENT PRENATAL CARE: CPT | Performed by: PHYSICIAN ASSISTANT

## 2022-04-11 NOTE — PROGRESS NOTES
Chief Complaint   Patient presents with   • Routine Prenatal Visit     Ob visit with ultrasound       HPI: 32 y.o.  at 32w0d gestation  She is here for EFW today d/t low maternal weight gain  EFW is wnl, 4 lbs 8 ozs, 64% and AC is 67.8%  Good FHR noted at 135 bpm  Pt is feeling good  Here with  today  Will plan f/u in 2 wks  Call for any issues in meantime     Vitals:    22 1028   BP: 118/68   Weight: 72.2 kg (159 lb 3.2 oz)       ROS:  GI:  Negative  : na  Pulmonary: Negative     A/P  1. Intrauterine pregnancy at 32w0d   2. Pregnancy Risk:  NORMAL    Diagnoses and all orders for this visit:    1. 32 weeks gestation of pregnancy (Primary)  -     POC Urinalysis Dipstick, Multipro    2. Screening for blood or protein in urine  -     POC Urinalysis Dipstick, Multipro    3. Low maternal weight gain, third trimester        -----------------------  PLAN:   Return in about 2 weeks (around 2022) for with Dr Orta.      DAREN Longo  2022 10:49 EDT

## 2022-04-25 ENCOUNTER — ROUTINE PRENATAL (OUTPATIENT)
Dept: OBSTETRICS AND GYNECOLOGY | Age: 32
End: 2022-04-25

## 2022-04-25 VITALS — SYSTOLIC BLOOD PRESSURE: 108 MMHG | BODY MASS INDEX: 26.31 KG/M2 | DIASTOLIC BLOOD PRESSURE: 64 MMHG | WEIGHT: 163 LBS

## 2022-04-25 DIAGNOSIS — Z13.89 SCREENING FOR BLOOD OR PROTEIN IN URINE: Primary | ICD-10-CM

## 2022-04-25 DIAGNOSIS — Z3A.34 34 WEEKS GESTATION OF PREGNANCY: ICD-10-CM

## 2022-04-25 LAB
GLUCOSE UR STRIP-MCNC: NEGATIVE MG/DL
PROT UR STRIP-MCNC: NEGATIVE MG/DL

## 2022-04-25 PROCEDURE — 0502F SUBSEQUENT PRENATAL CARE: CPT | Performed by: OBSTETRICS & GYNECOLOGY

## 2022-04-25 NOTE — PROGRESS NOTES
Patient is feeling well.  She is doing okay with work.  She has not had headaches.    Doppler heart tones are positive and fundal height is appropriate  Weights about 1 week ago was 4 pounds 8 ounces at the 64th percentile.  Abdominal circumference was at the 67th percentile  Blood pressure 108/64 with no protein  Weight gain for pregnancy is normal.    Assessment- 34 weeks  Patient is doing well overall  Normal fetal weight at 32 weeks  Pediatrician has been picked  Follow-up in 2 weeks.

## 2022-05-03 ENCOUNTER — TELEPHONE (OUTPATIENT)
Dept: OBSTETRICS AND GYNECOLOGY | Age: 32
End: 2022-05-03

## 2022-05-03 NOTE — TELEPHONE ENCOUNTER
Pt is calling about [facility] paperwork that we are supposed to be working on.  She is following up on this because the paperwork is due to her work by tomorrow.      Can someone call her that is working on them?

## 2022-05-09 ENCOUNTER — ROUTINE PRENATAL (OUTPATIENT)
Dept: OBSTETRICS AND GYNECOLOGY | Age: 32
End: 2022-05-09

## 2022-05-09 VITALS — WEIGHT: 164 LBS | BODY MASS INDEX: 26.47 KG/M2 | DIASTOLIC BLOOD PRESSURE: 80 MMHG | SYSTOLIC BLOOD PRESSURE: 124 MMHG

## 2022-05-09 DIAGNOSIS — Z13.89 SCREENING FOR BLOOD OR PROTEIN IN URINE: Primary | ICD-10-CM

## 2022-05-09 DIAGNOSIS — Z34.00 SUPERVISION OF NORMAL FIRST PREGNANCY, ANTEPARTUM: ICD-10-CM

## 2022-05-09 DIAGNOSIS — Z36.85 ANTENATAL SCREENING FOR STREPTOCOCCUS B: ICD-10-CM

## 2022-05-09 LAB
GLUCOSE UR STRIP-MCNC: NEGATIVE MG/DL
PROT UR STRIP-MCNC: NEGATIVE MG/DL

## 2022-05-09 PROCEDURE — 0502F SUBSEQUENT PRENATAL CARE: CPT | Performed by: OBSTETRICS & GYNECOLOGY

## 2022-05-09 NOTE — PROGRESS NOTES
Patient is feeling well.  Good fetal movements.    Blood pressure 124/80 with no protein  Total weight gain for pregnancy is 25 pounds  Doppler heart tones are positive and fundal height is appropriate  Group B strep swab is collected  Cervix is mid position fingertip and 70%.  No bleeding is seen.    Assessment-36 weeks  We discussed labor.  Recommend kick counts  Follow-up weekly

## 2022-05-11 LAB — GP B STREP DNA SPEC QL NAA+PROBE: NEGATIVE

## 2022-05-14 ENCOUNTER — HOSPITAL ENCOUNTER (EMERGENCY)
Facility: HOSPITAL | Age: 32
Discharge: HOME | End: 2022-05-14
Attending: OBSTETRICS & GYNECOLOGY | Admitting: OBSTETRICS & GYNECOLOGY

## 2022-05-14 VITALS
RESPIRATION RATE: 16 BRPM | TEMPERATURE: 98.4 F | BODY MASS INDEX: 26.83 KG/M2 | SYSTOLIC BLOOD PRESSURE: 108 MMHG | DIASTOLIC BLOOD PRESSURE: 75 MMHG | HEART RATE: 81 BPM | WEIGHT: 166.2 LBS

## 2022-05-14 LAB
EXPIRATION DATE: NORMAL
Lab: NORMAL
PROT UR STRIP-MCNC: NEGATIVE MG/DL

## 2022-05-14 PROCEDURE — 99283 EMERGENCY DEPT VISIT LOW MDM: CPT | Performed by: OBSTETRICS & GYNECOLOGY

## 2022-05-14 PROCEDURE — 81002 URINALYSIS NONAUTO W/O SCOPE: CPT | Performed by: OBSTETRICS & GYNECOLOGY

## 2022-05-14 PROCEDURE — 59025 FETAL NON-STRESS TEST: CPT

## 2022-05-14 NOTE — H&P
[facility]  Mo Castro  : 1990  MRN: [id number]  CSN: [id number]    History and Physical    Subjective   Chief Complaint   Patient presents with   • Elevated Blood Pressure     [facility]: pt here due to elevated BP in urgent care earlier today. Denies any other symptoms.  +FM.  Denies LOF or VB.      Mo Castro is a 32 y.o. year old  with an Estimated Date of Delivery: 22 currently at 36w5d presenting with complaints of elevated blood pressure today while being seen at urgent care for nonobstetrical condition. Patient denies headache, blurred vision, abdominal pain, LOF, VB or contractions. Notes good fetal movement. No increase in swelling.    Prenatal care has been with Dr. Orta.  It has been benign.    OB History    Para Term  AB Living   1 0 0 0 0 0   SAB IAB Ectopic Molar Multiple Live Births   0 0 0 0 0 0      # Outcome Date GA Lbr Jose/2nd Weight Sex Delivery Anes PTL Lv   1 Current              Past Medical History:   Diagnosis Date   • Headache      Past Surgical History:   Procedure Laterality Date   • TONSILLECTOMY       No current facility-administered medications for this encounter.    Allergies   Allergen Reactions   • Amoxicillin Hives   • Grass Hives     Social History    Tobacco Use      Smoking status: Never Smoker      Smokeless tobacco: Never Used    Review of Systems      Objective   /73   Pulse 86   Temp 98.4 °F (36.9 °C) (Oral)   Resp 16   LMP 2021 (Approximate) Comment: cycle 26-27 days  General: well developed; well nourished  no acute distress   Abdomen: Gravid    FHT's: reassuring and category 1      Cervix: was not checked.   Presentation: n/a   Contractions: none   Back: n/a      Prenatal Labs  Lab Results   Component Value Date    HGB 12.5 2022    RUBELLAABIGG 4.23 11/15/2021    HEPBSAG Negative 11/15/2021    ABSCRN Negative 11/15/2021    CFI1RTY9 Non Reactive 11/15/2021    HEPCVIRUSABY <0.1 11/15/2021    GCT 99 2022     STREPGPB Negative 05/09/2022    URINECX Final report 11/15/2021    CHLAMNAA Negative 11/15/2021    NGONORRHON Negative 11/15/2021       Current Labs Reviewed   Urine protein-neg       Assessment   1. IUP at 36w5d  2. Labile blood pressure with no evidence of preeclampsia     Plan   1. Discharge home  2. Keep scheduled appointment    Michelle Mcrae MD  5/14/2022  15:25 EDT

## 2022-05-18 ENCOUNTER — ROUTINE PRENATAL (OUTPATIENT)
Dept: OBSTETRICS AND GYNECOLOGY | Age: 32
End: 2022-05-18

## 2022-05-18 VITALS — WEIGHT: 168.6 LBS | SYSTOLIC BLOOD PRESSURE: 120 MMHG | DIASTOLIC BLOOD PRESSURE: 72 MMHG | BODY MASS INDEX: 27.21 KG/M2

## 2022-05-18 DIAGNOSIS — Z13.89 SCREENING FOR HEMATURIA OR PROTEINURIA: ICD-10-CM

## 2022-05-18 DIAGNOSIS — Z3A.37 37 WEEKS GESTATION OF PREGNANCY: Primary | ICD-10-CM

## 2022-05-18 LAB
GLUCOSE UR STRIP-MCNC: NEGATIVE MG/DL
PROT UR STRIP-MCNC: NEGATIVE MG/DL

## 2022-05-18 PROCEDURE — 0502F SUBSEQUENT PRENATAL CARE: CPT | Performed by: OBSTETRICS & GYNECOLOGY

## 2022-05-18 NOTE — PROGRESS NOTES
The patient went to an urgent care with right pain in her lower arm.  She was diagnosed with tendinitis and placed in a splint.  Blood pressure at urgent care was elevated.  Patient went to labor and delivery and was evaluated.  She is not having any headache or visual changes.  Baby is moving well.  The splint is helping.    GBS is negative.  Cervix is 1 cm 50% and -2 station  Blood pressure 120/72 with no protein  Weight gain of 2 pounds.  Trace lower extremity edema  Doppler heart tones are positive and fundal height is appropriate.    Assessment-37 weeks  Elevated blood pressure at urgent care and tendinitis in the right arm.  Recommend patient stop work now.  Encouraged the patient to increase her rest.  Discussed signs and symptoms of preeclampsia to watch for  Patient became lightheaded after cervical exam.  She is now drinking water and feeling better.  Follow-up 1 week.

## 2022-05-23 ENCOUNTER — ROUTINE PRENATAL (OUTPATIENT)
Dept: OBSTETRICS AND GYNECOLOGY | Age: 32
End: 2022-05-23

## 2022-05-23 VITALS — SYSTOLIC BLOOD PRESSURE: 108 MMHG | WEIGHT: 165 LBS | DIASTOLIC BLOOD PRESSURE: 74 MMHG | BODY MASS INDEX: 26.63 KG/M2

## 2022-05-23 DIAGNOSIS — Z34.00 SUPERVISION OF NORMAL FIRST PREGNANCY, ANTEPARTUM: ICD-10-CM

## 2022-05-23 DIAGNOSIS — Z13.89 SCREENING FOR BLOOD OR PROTEIN IN URINE: Primary | ICD-10-CM

## 2022-05-23 LAB
GLUCOSE UR STRIP-MCNC: NEGATIVE MG/DL
PROT UR STRIP-MCNC: NEGATIVE MG/DL

## 2022-05-23 PROCEDURE — 0502F SUBSEQUENT PRENATAL CARE: CPT | Performed by: OBSTETRICS & GYNECOLOGY

## 2022-05-23 NOTE — PROGRESS NOTES
The patient is feeling good fetal movements.  She is wearing her splint.  No headaches or visual changes.    Cervix is 1 cm 50% and -2 station.  Group B strep is negative  Fundal height is 36 to 37 cm.  Blood pressure 108/74 with no protein.    Assessment-38 weeks  Blood pressures improved off work.  Continue off work.  Fundal height is slightly low.  Weight gain has been normal.  Fetal weight was normal at 34 weeks.  Check ultrasound this week.  Recommend kick counts  Tendinitis-continue splint and right arm.

## 2022-05-26 ENCOUNTER — ROUTINE PRENATAL (OUTPATIENT)
Dept: OBSTETRICS AND GYNECOLOGY | Age: 32
End: 2022-05-26

## 2022-05-26 VITALS — BODY MASS INDEX: 27.28 KG/M2 | DIASTOLIC BLOOD PRESSURE: 66 MMHG | SYSTOLIC BLOOD PRESSURE: 112 MMHG | WEIGHT: 169 LBS

## 2022-05-26 DIAGNOSIS — Z34.00 SUPERVISION OF NORMAL FIRST PREGNANCY, ANTEPARTUM: ICD-10-CM

## 2022-05-26 DIAGNOSIS — O26.849 UTERINE SIZE DATE DISCREPANCY PREGNANCY: ICD-10-CM

## 2022-05-26 DIAGNOSIS — Z13.89 SCREENING FOR BLOOD OR PROTEIN IN URINE: Primary | ICD-10-CM

## 2022-05-26 LAB
GLUCOSE UR STRIP-MCNC: NEGATIVE MG/DL
PROT UR STRIP-MCNC: NEGATIVE MG/DL

## 2022-05-26 PROCEDURE — 0502F SUBSEQUENT PRENATAL CARE: CPT | Performed by: OBSTETRICS & GYNECOLOGY

## 2022-05-26 PROCEDURE — 76816 OB US FOLLOW-UP PER FETUS: CPT | Performed by: OBSTETRICS & GYNECOLOGY

## 2022-05-26 NOTE — PROGRESS NOTES
The patient is feeling good fetal movements.  She has had some muscular pains in her pelvis and hip.  She is wearing her splint.  No headaches or visual changes    Ultrasound done today due to low fundal height.  Estimated fetal weight is at the 50th percentile at 7 pounds 4 ounces.  CHEN is normal at 12.  Baby is vertex.  Blood pressure 112/66  No proteinuria  Cervix is 1 to 2 cm 70% and mid position.    Assessment-38 weeks  We have reviewed the arrive trial.  Patient is interested in 39-week induction.  Her cervix is more favorable than previous but still less than 2 cm.  After discussion the patient would like to proceed with induction.  She will come in for Cervidil ripening on Monday night.  We discussed Cervidil.  She will also make a follow-up appointment on Thursday if she changes her mind about induction.  Continue splint for tendinitis.

## 2022-05-27 ENCOUNTER — TELEPHONE (OUTPATIENT)
Dept: OBSTETRICS AND GYNECOLOGY | Age: 32
End: 2022-05-27

## 2022-05-27 NOTE — TELEPHONE ENCOUNTER
Provider: DR FUNK OR STAFF    Caller: SAMANTHA LUCÍA     Phone Number: [phone]    Reason for Call: PT WAS GIVEN THE OPTION TO GET INDUCE OR COME IN FOR OB F/U     SHE WISHES TO CANCEL 5/30/22 INDUCTION     I LVM W/SURGERY SCHEDULER     SENDING MSG TO MAKE SURE APPT GETS CANCELLED

## 2022-06-02 ENCOUNTER — ROUTINE PRENATAL (OUTPATIENT)
Dept: OBSTETRICS AND GYNECOLOGY | Age: 32
End: 2022-06-02

## 2022-06-02 VITALS — BODY MASS INDEX: 27.44 KG/M2 | DIASTOLIC BLOOD PRESSURE: 74 MMHG | SYSTOLIC BLOOD PRESSURE: 108 MMHG | WEIGHT: 170 LBS

## 2022-06-02 DIAGNOSIS — Z13.89 SCREENING FOR BLOOD OR PROTEIN IN URINE: Primary | ICD-10-CM

## 2022-06-02 DIAGNOSIS — Z34.00 SUPERVISION OF NORMAL FIRST PREGNANCY, ANTEPARTUM: ICD-10-CM

## 2022-06-02 LAB
GLUCOSE UR STRIP-MCNC: NEGATIVE MG/DL
PROT UR STRIP-MCNC: NEGATIVE MG/DL

## 2022-06-02 PROCEDURE — 0502F SUBSEQUENT PRENATAL CARE: CPT | Performed by: OBSTETRICS & GYNECOLOGY

## 2022-06-02 NOTE — PROGRESS NOTES
Patient canceled her induction.  She is feeling good movements.    Cervix is 2 cm 70% and -2 station.  Membrane sweep is done  Doppler heart tones are positive and fundal height is appropriate  Blood pressure 108/74 no protein.    Assessment-39 weeks 3 days  Patient canceled her induction.  She is hoping she will go into labor.  She will follow-up here with me next Monday when she is 40 weeks.  She would like to consider induction after that if she has not gone into labor.  Recommend kick counts.

## 2022-06-05 ENCOUNTER — ANESTHESIA EVENT (OUTPATIENT)
Dept: LABOR AND DELIVERY | Facility: HOSPITAL | Age: 32
End: 2022-06-05

## 2022-06-05 ENCOUNTER — HOSPITAL ENCOUNTER (INPATIENT)
Facility: HOSPITAL | Age: 32
LOS: 2 days | Discharge: HOME | End: 2022-06-07
Attending: OBSTETRICS & GYNECOLOGY | Admitting: OBSTETRICS & GYNECOLOGY

## 2022-06-05 ENCOUNTER — ANESTHESIA (OUTPATIENT)
Dept: LABOR AND DELIVERY | Facility: HOSPITAL | Age: 32
End: 2022-06-05

## 2022-06-05 PROBLEM — Z34.90 PREGNANCY: Status: ACTIVE | Noted: 2022-06-05

## 2022-06-05 LAB
ABO GROUP BLD: NORMAL
ATMOSPHERIC PRESS: 750.4 MMHG
BASE EXCESS BLDCOV CALC-SCNC: -2.3 MMOL/L (ref -30–30)
BDY SITE: ABNORMAL
BLD GP AB SCN SERPL QL: NEGATIVE
COLLECT TME SMN: ABNORMAL
DEPRECATED RDW RBC AUTO: 40.4 FL (ref 37–54)
ERYTHROCYTE [DISTWIDTH] IN BLOOD BY AUTOMATED COUNT: 12.1 % (ref 12.3–15.4)
HCO3 BLDCOV-SCNC: 23.3 MMOL/L
HCT VFR BLD AUTO: 35.6 % (ref 34–46.6)
HGB BLD-MCNC: 12.2 G/DL (ref 12–15.9)
INHALED O2 CONCENTRATION: 21 %
MCH RBC QN AUTO: 31.4 PG (ref 26.6–33)
MCHC RBC AUTO-ENTMCNC: 34.3 G/DL (ref 31.5–35.7)
MCV RBC AUTO: 91.8 FL (ref 79–97)
MODALITY: ABNORMAL
NOTE: ABNORMAL
PCO2 BLDCOV: 41.8 MM HG (ref 35–51.3)
PH BLDCOV: 7.35 PH UNITS (ref 7.26–7.4)
PLATELET # BLD AUTO: 196 10*3/MM3 (ref 140–450)
PMV BLD AUTO: 10.6 FL (ref 6–12)
PO2 BLDCOV: 29.2 MM HG (ref 19–39)
RBC # BLD AUTO: 3.88 10*6/MM3 (ref 3.77–5.28)
RH BLD: POSITIVE
SAO2 % BLDCOA: 52.5 % (ref 92–99)
SAO2 % BLDCOV: ABNORMAL %
SARS-COV-2 RNA PNL SPEC NAA+PROBE: NOT DETECTED
T&S EXPIRATION DATE: NORMAL
WBC NRBC COR # BLD: 11.53 10*3/MM3 (ref 3.4–10.8)

## 2022-06-05 PROCEDURE — 0KQM0ZZ REPAIR PERINEUM MUSCLE, OPEN APPROACH: ICD-10-PCS | Performed by: OBSTETRICS & GYNECOLOGY

## 2022-06-05 PROCEDURE — 85027 COMPLETE CBC AUTOMATED: CPT | Performed by: OBSTETRICS & GYNECOLOGY

## 2022-06-05 PROCEDURE — 86901 BLOOD TYPING SEROLOGIC RH(D): CPT | Performed by: OBSTETRICS & GYNECOLOGY

## 2022-06-05 PROCEDURE — C1755 CATHETER, INTRASPINAL: HCPCS

## 2022-06-05 PROCEDURE — 25010000002 ONDANSETRON PER 1 MG: Performed by: OBSTETRICS & GYNECOLOGY

## 2022-06-05 PROCEDURE — 59400 OBSTETRICAL CARE: CPT | Performed by: OBSTETRICS & GYNECOLOGY

## 2022-06-05 PROCEDURE — 3E033VJ INTRODUCTION OF OTHER HORMONE INTO PERIPHERAL VEIN, PERCUTANEOUS APPROACH: ICD-10-PCS | Performed by: OBSTETRICS & GYNECOLOGY

## 2022-06-05 PROCEDURE — 86850 RBC ANTIBODY SCREEN: CPT | Performed by: OBSTETRICS & GYNECOLOGY

## 2022-06-05 PROCEDURE — C1755 CATHETER, INTRASPINAL: HCPCS | Performed by: ANESTHESIOLOGY

## 2022-06-05 PROCEDURE — 86900 BLOOD TYPING SEROLOGIC ABO: CPT | Performed by: OBSTETRICS & GYNECOLOGY

## 2022-06-05 PROCEDURE — 87635 SARS-COV-2 COVID-19 AMP PRB: CPT | Performed by: OBSTETRICS & GYNECOLOGY

## 2022-06-05 PROCEDURE — 99202 OFFICE O/P NEW SF 15 MIN: CPT | Performed by: OBSTETRICS & GYNECOLOGY

## 2022-06-05 PROCEDURE — 25010000002 BUTORPHANOL PER 1 MG: Performed by: OBSTETRICS & GYNECOLOGY

## 2022-06-05 PROCEDURE — 82803 BLOOD GASES ANY COMBINATION: CPT

## 2022-06-05 RX ORDER — CARBOPROST TROMETHAMINE 250 UG/ML
250 INJECTION, SOLUTION INTRAMUSCULAR ONCE AS NEEDED
Status: DISCONTINUED | OUTPATIENT
Start: 2022-06-05 | End: 2022-06-07 | Stop reason: HOSPADM

## 2022-06-05 RX ORDER — LIDOCAINE HYDROCHLORIDE 10 MG/ML
5 INJECTION, SOLUTION EPIDURAL; INFILTRATION; INTRACAUDAL; PERINEURAL AS NEEDED
Status: DISCONTINUED | OUTPATIENT
Start: 2022-06-05 | End: 2022-06-05

## 2022-06-05 RX ORDER — HYDROCORTISONE 25 MG/G
1 CREAM TOPICAL AS NEEDED
Status: DISCONTINUED | OUTPATIENT
Start: 2022-06-05 | End: 2022-06-07 | Stop reason: HOSPADM

## 2022-06-05 RX ORDER — ONDANSETRON 4 MG/1
4 TABLET, FILM COATED ORAL EVERY 6 HOURS PRN
Status: DISCONTINUED | OUTPATIENT
Start: 2022-06-05 | End: 2022-06-05

## 2022-06-05 RX ORDER — FAMOTIDINE 10 MG/ML
20 INJECTION, SOLUTION INTRAVENOUS ONCE AS NEEDED
Status: DISCONTINUED | OUTPATIENT
Start: 2022-06-05 | End: 2022-06-05

## 2022-06-05 RX ORDER — OXYCODONE AND ACETAMINOPHEN 7.5; 325 MG/1; MG/1
1 TABLET ORAL EVERY 4 HOURS PRN
Status: DISCONTINUED | OUTPATIENT
Start: 2022-06-05 | End: 2022-06-07 | Stop reason: HOSPADM

## 2022-06-05 RX ORDER — DOCUSATE SODIUM 100 MG/1
100 CAPSULE, LIQUID FILLED ORAL 2 TIMES DAILY
Status: DISCONTINUED | OUTPATIENT
Start: 2022-06-05 | End: 2022-06-07 | Stop reason: HOSPADM

## 2022-06-05 RX ORDER — MISOPROSTOL 200 UG/1
600 TABLET ORAL ONCE AS NEEDED
Status: DISCONTINUED | OUTPATIENT
Start: 2022-06-05 | End: 2022-06-07 | Stop reason: HOSPADM

## 2022-06-05 RX ORDER — SODIUM CHLORIDE 0.9 % (FLUSH) 0.9 %
10 SYRINGE (ML) INJECTION EVERY 12 HOURS SCHEDULED
Status: DISCONTINUED | OUTPATIENT
Start: 2022-06-05 | End: 2022-06-05

## 2022-06-05 RX ORDER — PROMETHAZINE HYDROCHLORIDE 25 MG/1
12.5 TABLET ORAL EVERY 4 HOURS PRN
Status: DISCONTINUED | OUTPATIENT
Start: 2022-06-05 | End: 2022-06-07 | Stop reason: HOSPADM

## 2022-06-05 RX ORDER — OXYTOCIN/0.9 % SODIUM CHLORIDE 30/500 ML
250 PLASTIC BAG, INJECTION (ML) INTRAVENOUS CONTINUOUS PRN
Status: ACTIVE | OUTPATIENT
Start: 2022-06-05 | End: 2022-06-05

## 2022-06-05 RX ORDER — PHYTONADIONE 1 MG/.5ML
INJECTION, EMULSION INTRAMUSCULAR; INTRAVENOUS; SUBCUTANEOUS
Status: ACTIVE
Start: 2022-06-05 | End: 2022-06-06

## 2022-06-05 RX ORDER — FAMOTIDINE 10 MG/ML
20 INJECTION, SOLUTION INTRAVENOUS EVERY 12 HOURS PRN
Status: DISCONTINUED | OUTPATIENT
Start: 2022-06-05 | End: 2022-06-05

## 2022-06-05 RX ORDER — MISOPROSTOL 200 UG/1
800 TABLET ORAL ONCE AS NEEDED
Status: DISCONTINUED | OUTPATIENT
Start: 2022-06-05 | End: 2022-06-05

## 2022-06-05 RX ORDER — FAMOTIDINE 20 MG/1
20 TABLET, FILM COATED ORAL EVERY 12 HOURS PRN
Status: DISCONTINUED | OUTPATIENT
Start: 2022-06-05 | End: 2022-06-05

## 2022-06-05 RX ORDER — OXYTOCIN/0.9 % SODIUM CHLORIDE 30/500 ML
125 PLASTIC BAG, INJECTION (ML) INTRAVENOUS CONTINUOUS PRN
Status: DISCONTINUED | OUTPATIENT
Start: 2022-06-05 | End: 2022-06-07 | Stop reason: HOSPADM

## 2022-06-05 RX ORDER — METHYLERGONOVINE MALEATE 0.2 MG/ML
200 INJECTION INTRAVENOUS ONCE AS NEEDED
Status: DISCONTINUED | OUTPATIENT
Start: 2022-06-05 | End: 2022-06-05

## 2022-06-05 RX ORDER — EPHEDRINE SULFATE 50 MG/ML
5 INJECTION, SOLUTION INTRAVENOUS AS NEEDED
Status: DISCONTINUED | OUTPATIENT
Start: 2022-06-05 | End: 2022-06-05

## 2022-06-05 RX ORDER — OXYCODONE HYDROCHLORIDE AND ACETAMINOPHEN 5; 325 MG/1; MG/1
1 TABLET ORAL EVERY 4 HOURS PRN
Status: DISCONTINUED | OUTPATIENT
Start: 2022-06-05 | End: 2022-06-07 | Stop reason: HOSPADM

## 2022-06-05 RX ORDER — BUTORPHANOL TARTRATE 1 MG/ML
1 INJECTION, SOLUTION INTRAMUSCULAR; INTRAVENOUS
Status: DISCONTINUED | OUTPATIENT
Start: 2022-06-05 | End: 2022-06-05

## 2022-06-05 RX ORDER — ONDANSETRON 2 MG/ML
4 INJECTION INTRAMUSCULAR; INTRAVENOUS EVERY 6 HOURS PRN
Status: DISCONTINUED | OUTPATIENT
Start: 2022-06-05 | End: 2022-06-07 | Stop reason: HOSPADM

## 2022-06-05 RX ORDER — OXYTOCIN/0.9 % SODIUM CHLORIDE 30/500 ML
2-20 PLASTIC BAG, INJECTION (ML) INTRAVENOUS
Status: DISCONTINUED | OUTPATIENT
Start: 2022-06-05 | End: 2022-06-05

## 2022-06-05 RX ORDER — PROMETHAZINE HYDROCHLORIDE 12.5 MG/1
12.5 SUPPOSITORY RECTAL EVERY 6 HOURS PRN
Status: DISCONTINUED | OUTPATIENT
Start: 2022-06-05 | End: 2022-06-07 | Stop reason: HOSPADM

## 2022-06-05 RX ORDER — MAGNESIUM CARB/ALUMINUM HYDROX 105-160MG
30 TABLET,CHEWABLE ORAL ONCE
Status: DISCONTINUED | OUTPATIENT
Start: 2022-06-05 | End: 2022-06-05

## 2022-06-05 RX ORDER — ONDANSETRON 2 MG/ML
4 INJECTION INTRAMUSCULAR; INTRAVENOUS EVERY 6 HOURS PRN
Status: DISCONTINUED | OUTPATIENT
Start: 2022-06-05 | End: 2022-06-05

## 2022-06-05 RX ORDER — CARBOPROST TROMETHAMINE 250 UG/ML
250 INJECTION, SOLUTION INTRAMUSCULAR
Status: DISCONTINUED | OUTPATIENT
Start: 2022-06-05 | End: 2022-06-05

## 2022-06-05 RX ORDER — DIPHENHYDRAMINE HYDROCHLORIDE 50 MG/ML
12.5 INJECTION INTRAMUSCULAR; INTRAVENOUS EVERY 8 HOURS PRN
Status: DISCONTINUED | OUTPATIENT
Start: 2022-06-05 | End: 2022-06-05

## 2022-06-05 RX ORDER — BISACODYL 10 MG
10 SUPPOSITORY, RECTAL RECTAL DAILY PRN
Status: DISCONTINUED | OUTPATIENT
Start: 2022-06-06 | End: 2022-06-07 | Stop reason: HOSPADM

## 2022-06-05 RX ORDER — ONDANSETRON 4 MG/1
4 TABLET, FILM COATED ORAL EVERY 8 HOURS PRN
Status: DISCONTINUED | OUTPATIENT
Start: 2022-06-05 | End: 2022-06-07 | Stop reason: HOSPADM

## 2022-06-05 RX ORDER — SODIUM CHLORIDE 0.9 % (FLUSH) 0.9 %
10 SYRINGE (ML) INJECTION AS NEEDED
Status: DISCONTINUED | OUTPATIENT
Start: 2022-06-05 | End: 2022-06-05

## 2022-06-05 RX ORDER — ONDANSETRON 2 MG/ML
4 INJECTION INTRAMUSCULAR; INTRAVENOUS ONCE AS NEEDED
Status: DISCONTINUED | OUTPATIENT
Start: 2022-06-05 | End: 2022-06-05

## 2022-06-05 RX ORDER — SODIUM CHLORIDE, SODIUM LACTATE, POTASSIUM CHLORIDE, CALCIUM CHLORIDE 600; 310; 30; 20 MG/100ML; MG/100ML; MG/100ML; MG/100ML
125 INJECTION, SOLUTION INTRAVENOUS CONTINUOUS
Status: DISCONTINUED | OUTPATIENT
Start: 2022-06-05 | End: 2022-06-05

## 2022-06-05 RX ORDER — ERYTHROMYCIN 5 MG/G
OINTMENT OPHTHALMIC
Status: ACTIVE
Start: 2022-06-05 | End: 2022-06-06

## 2022-06-05 RX ORDER — PROMETHAZINE HYDROCHLORIDE 25 MG/1
25 TABLET ORAL EVERY 6 HOURS PRN
Status: DISCONTINUED | OUTPATIENT
Start: 2022-06-05 | End: 2022-06-07 | Stop reason: HOSPADM

## 2022-06-05 RX ORDER — TERBUTALINE SULFATE 1 MG/ML
0.25 INJECTION, SOLUTION SUBCUTANEOUS AS NEEDED
Status: DISCONTINUED | OUTPATIENT
Start: 2022-06-05 | End: 2022-06-05

## 2022-06-05 RX ORDER — IBUPROFEN 800 MG/1
800 TABLET ORAL EVERY 8 HOURS PRN
Status: DISCONTINUED | OUTPATIENT
Start: 2022-06-05 | End: 2022-06-07 | Stop reason: HOSPADM

## 2022-06-05 RX ORDER — OXYTOCIN/0.9 % SODIUM CHLORIDE 30/500 ML
999 PLASTIC BAG, INJECTION (ML) INTRAVENOUS ONCE
Status: COMPLETED | OUTPATIENT
Start: 2022-06-05 | End: 2022-06-05

## 2022-06-05 RX ORDER — LIDOCAINE HYDROCHLORIDE AND EPINEPHRINE 15; 5 MG/ML; UG/ML
INJECTION, SOLUTION EPIDURAL AS NEEDED
Status: DISCONTINUED | OUTPATIENT
Start: 2022-06-05 | End: 2022-06-05 | Stop reason: SURG

## 2022-06-05 RX ORDER — TEMAZEPAM 7.5 MG/1
7.5 CAPSULE ORAL NIGHTLY PRN
Status: DISCONTINUED | OUTPATIENT
Start: 2022-06-05 | End: 2022-06-07 | Stop reason: HOSPADM

## 2022-06-05 RX ORDER — SODIUM CHLORIDE 0.9 % (FLUSH) 0.9 %
1-10 SYRINGE (ML) INJECTION AS NEEDED
Status: DISCONTINUED | OUTPATIENT
Start: 2022-06-05 | End: 2022-06-07 | Stop reason: HOSPADM

## 2022-06-05 RX ORDER — FENTANYL CIT 0.2 MG/100ML-ROPIV 0.2%-NACL 0.9% EPIDURAL INJ 2/0.2 MCG/ML-%
10 SOLUTION INJECTION CONTINUOUS
Status: DISCONTINUED | OUTPATIENT
Start: 2022-06-05 | End: 2022-06-05

## 2022-06-05 RX ADMIN — Medication 2 MILLI-UNITS/MIN: at 02:59

## 2022-06-05 RX ADMIN — Medication 10 ML/HR: at 13:38

## 2022-06-05 RX ADMIN — SODIUM CHLORIDE, POTASSIUM CHLORIDE, SODIUM LACTATE AND CALCIUM CHLORIDE 999 ML/HR: 600; 310; 30; 20 INJECTION, SOLUTION INTRAVENOUS at 07:19

## 2022-06-05 RX ADMIN — SODIUM CHLORIDE, POTASSIUM CHLORIDE, SODIUM LACTATE AND CALCIUM CHLORIDE 125 ML/HR: 600; 310; 30; 20 INJECTION, SOLUTION INTRAVENOUS at 01:36

## 2022-06-05 RX ADMIN — IBUPROFEN 800 MG: 800 TABLET, FILM COATED ORAL at 18:07

## 2022-06-05 RX ADMIN — Medication 10 ML/HR: at 07:31

## 2022-06-05 RX ADMIN — LIDOCAINE HYDROCHLORIDE AND EPINEPHRINE 3 ML: 15; 5 INJECTION, SOLUTION EPIDURAL at 07:26

## 2022-06-05 RX ADMIN — Medication 999 ML/HR: at 16:14

## 2022-06-05 RX ADMIN — SODIUM CHLORIDE, POTASSIUM CHLORIDE, SODIUM LACTATE AND CALCIUM CHLORIDE 125 ML/HR: 600; 310; 30; 20 INJECTION, SOLUTION INTRAVENOUS at 11:57

## 2022-06-05 RX ADMIN — BUTORPHANOL TARTRATE 1 MG: 1 INJECTION, SOLUTION INTRAMUSCULAR; INTRAVENOUS at 04:17

## 2022-06-05 RX ADMIN — ONDANSETRON 4 MG: 2 INJECTION INTRAMUSCULAR; INTRAVENOUS at 13:41

## 2022-06-05 RX ADMIN — Medication 250 ML/HR: at 16:44

## 2022-06-05 NOTE — PLAN OF CARE
Goal Outcome Evaluation:  Plan of Care Reviewed With: patient, spouse        Progress: improving       Pt transferred to PP after delivery of infant.  Mother and baby are well.

## 2022-06-05 NOTE — H&P
[facility]   Obstetrics and Gynecology   History & Physical    2022    Patient: Mo Castro          MR#:[id number]    Chief Complaint   Patient presents with   • Rupture of Membranes     Leaking started at 1630 with bir gush at 2330.  Clear fluid.  Rare contraction.  +FM       Subjective     32 y.o. female  at 39w6d presents in the early morning with complaint of leaking fluid since just before midnight with few mild uterine contractions.  Patient is admitted and started on Pitocin for augmentation.  This morning the patient is resting in bed with her epidural not in active labor as of yet.  Prenatal care has been without major complications.      Patient Active Problem List   Diagnosis   • Migraine   • Supervision of normal first pregnancy, antepartum   • Pregnancy       Past Medical History:   Diagnosis Date   • Headache        Past Surgical History:   Procedure Laterality Date   • TONSILLECTOMY         Obstetric History:  OB History        1    Para   0    Term   0       0    AB   0    Living   0       SAB   0    IAB   0    Ectopic   0    Molar        Multiple   0    Live Births                   Menstrual History:     Patient's last menstrual period was 2021 (approximate).       # 1 - Date: None, Sex: None, Weight: None, GA: None, Delivery: None, Apgar1: None, Apgar5: None, Living: None, Birth Comments: None      Prenatal Information:  Prenatal Results     POC Urine Glucose/Protein     Test Value Reference Range Date Time    Urine Glucose  Negative mg/dL Negative, 1000 mg/dL (3+) 22 1515    Urine Protein  Negative mg/dL Negative 22 1515          Initial Prenatal Labs     Test Value Reference Range Date Time    Hemoglobin  13.1 g/dL 11.1 - 15.9 11/15/21 1411    Hematocrit  39.5 % 34.0 - 46.6 11/15/21 1411    Platelets  259 x10E3/uL 150 - 450 11/15/21 1411    Rubella IgG  4.23 index Immune >0.99 11/15/21 1411    Hepatitis B SAg  Negative  Negative  11/15/21 1411    Hepatitis C Ab  <0.1 s/co ratio 0.0 - 0.9 11/15/21 1411    RPR  Non Reactive  Non Reactive 11/15/21 1411    ABO  O   06/05/22 0135    Rh  Positive   06/05/22 0135    Antibody Screen  Negative  Negative 11/15/21 1411    HIV  Non Reactive  Non Reactive 11/15/21 1411    Urine Culture  Final report   11/15/21 1355    Gonorrhea  Negative  Negative 11/15/21 1518    Chlamydia  Negative  Negative 11/15/21 1518    TSH        HgB A1c               2nd and 3rd Trimester     Test Value Reference Range Date Time    Hemoglobin (repeated)  12.2 g/dL 12.0 - 15.9 06/05/22 0135       12.5 g/dL 11.1 - 15.9 03/07/22 1045    Hematocrit (repeated)  35.6 % 34.0 - 46.6 06/05/22 0135       36.9 % 34.0 - 46.6 03/07/22 1045    Platelets   196 10*3/mm3 140 - 450 06/05/22 0135       221 x10E3/uL 150 - 450 03/07/22 1045       259 x10E3/uL 150 - 450 11/15/21 1411    GCT  99 mg/dL 65 - 139 03/07/22 1045    Antibody Screen (repeated)  Negative   06/05/22 0135    GTT Fasting        GTT 1 Hr        GTT 2 Hr        GTT 3 Hr        Group B Strep  Negative  Negative 05/09/22 1102          Drug Screening     Test Value Reference Range Date Time    Amphetamine Screen        Barbiturate Screen        Benzodiazepine Screen        Methadone Screen        Phencyclidine Screen        Opiates Screen        THC Screen        Cocaine Screen        Propoxyphene Screen        Buprenorphine Screen        Methamphetamine Screen        Oxycodone Screen        Tricyclic Antidepressants Screen              Other (Risk screening)     Test Value Reference Range Date Time    Varicella IgG ^ Pos H/O    11/15/21     Parvovirus IgG        CMV IgG        Cystic Fibrosis        Hemoglobin electrophoresis        NIPT        MSAFP-4        AFP (for NTD only)  *Screen Negative*   01/10/22 1243          Legend    ^: Historical                      External Prenatal Results     Pregnancy Outside Results - Transcribed From Office Records - See Scanned Records For  Details     Test Value Date Time    ABO  O  06/05/22 0135    Rh  Positive  06/05/22 0135    Antibody Screen  Negative  06/05/22 0135       Negative  11/15/21 1411    Varicella IgG ^ Pos H/O   11/15/21     Rubella  4.23 index 11/15/21 1411    Hgb  12.2 g/dL 06/05/22 0135       12.5 g/dL 03/07/22 1045       13.1 g/dL 11/15/21 1411    Hct  35.6 % 06/05/22 0135       36.9 % 03/07/22 1045       39.5 % 11/15/21 1411    Glucose Fasting GTT       Glucose Tolerance Test 1 hour       Glucose Tolerance Test 3 hour       Gonorrhea (discrete)  Negative  11/15/21 1518    Chlamydia (discrete)  Negative  11/15/21 1518    RPR  Non Reactive  11/15/21 1411    VDRL       Syphilis Antibody       HBsAg  Negative  11/15/21 1411    Herpes Simplex Virus PCR       Herpes Simplex VIrus Culture       HIV  Non Reactive  11/15/21 1411    Hep C RNA Quant PCR       Hep C Antibody  <0.1 s/co ratio 11/15/21 1411    AFP  69.6 ng/mL 01/10/22 1243    Group B Strep  Negative  05/09/22 1102    GBS Susceptibility to Clindamycin       GBS Susceptibility to Erythromycin       Fetal Fibronectin       Genetic Testing, Maternal Blood             Drug Screening     Test Value Date Time    Urine Drug Screen       Amphetamine Screen       Barbiturate Screen       Benzodiazepine Screen       Methadone Screen       Phencyclidine Screen       Opiates Screen       THC Screen       Cocaine Screen       Propoxyphene Screen       Buprenorphine Screen       Methamphetamine Screen       Oxycodone Screen       Tricyclic Antidepressants Screen             Legend    ^: Historical                          Family History   Problem Relation Age of Onset   • Hyperlipidemia Father    • Breast cancer Maternal Grandmother 68   • Ovarian cancer Maternal Grandmother 70   • Lymphoma Paternal Grandmother    • Hyperlipidemia Paternal Grandmother    • Other Mother         BRCA negative - full panel neg        Social History     Tobacco Use   • Smoking status: Never Smoker   • Smokeless  tobacco: Never Used   Vaping Use   • Vaping Use: Never used   Substance Use Topics   • Alcohol use: Not Currently     Alcohol/week: 2.0 standard drinks     Types: 2 Glasses of wine per week   • Drug use: No       Amoxicillin and Grass      Current Facility-Administered Medications:   •  butorphanol (STADOL) injection 1 mg, 1 mg, Intravenous, Q3H PRN, Ollie Shaw MD, 1 mg at 06/05/22 0417  •  diphenhydrAMINE (BENADRYL) injection 12.5 mg, 12.5 mg, Intravenous, Q8H PRN, Arnold Velazquez MD  •  ePHEDrine injection 5 mg, 5 mg, Intravenous, PRN, Arnold Velazquez MD  •  famotidine (PEPCID) injection 20 mg, 20 mg, Intravenous, Q12H PRN **OR** famotidine (PEPCID) tablet 20 mg, 20 mg, Oral, Q12H PRN, Ollie Shaw MD  •  famotidine (PEPCID) injection 20 mg, 20 mg, Intravenous, Once PRN, Arnold Velazquez MD  •  fentaNYL 2mcg/mL and ropivacaine 0.2% in NS epidural 100mL, 10 mL/hr, Epidural, Continuous, Arnold Velazquez MD, Last Rate: 10 mL/hr at 06/05/22 0731, 10 mL/hr at 06/05/22 0731  •  lactated ringers bolus 1,000 mL, 1,000 mL, Intravenous, Once PRN, Ollie Shaw MD  •  lactated ringers infusion, 125 mL/hr, Intravenous, Continuous, Ollie Shaw MD, Last Rate: 999 mL/hr at 06/05/22 0719, 999 mL/hr at 06/05/22 0719  •  lidocaine PF 1% (XYLOCAINE) injection 5 mL, 5 mL, Intradermal, PRN, Ollie Shaw MD  •  mineral oil liquid 30 mL, 30 mL, Topical, Once, Ollie Shaw MD  •  ondansetron (ZOFRAN) tablet 4 mg, 4 mg, Oral, Q6H PRN **OR** ondansetron (ZOFRAN) injection 4 mg, 4 mg, Intravenous, Q6H PRN, Ollie Shaw MD  •  ondansetron (ZOFRAN) injection 4 mg, 4 mg, Intravenous, Once PRN, Arnold Velazquez MD  •  oxytocin (PITOCIN) 30 units in 0.9% sodium chloride 500 mL (premix), 2-20 konstantin-units/min, Intravenous, Titrated, Ollie Shaw MD, Last Rate: 12 mL/hr at 06/05/22 1035, 12 konstantin-units/min at 06/05/22 1035  •  sodium chloride 0.9 % flush 10 mL, 10 mL, Intravenous, Q12H, Ollie Shaw  MD HIMANSHU  •  sodium chloride 0.9 % flush 10 mL, 10 mL, Intravenous, PRN, Ollie Shaw MD  •  terbutaline (BRETHINE) injection 0.25 mg, 0.25 mg, Subcutaneous, PRN, Ollie Shaw MD    Facility-Administered Medications Ordered in Other Encounters:   •  lidocaine-EPINEPHrine (XYLOCAINE W/EPI) 1.5 %-1:[id number] injection, , Injection, PRN, Walker Bejarano MD, 3 mL at 06/05/22 0726    Review of Systems  Review of Systems   Constitutional: Negative.    Respiratory: Negative.    Cardiovascular: Negative.    Gastrointestinal: Negative.    Genitourinary: Negative.    Psychiatric/Behavioral: Negative.        Objective     Vital Signs  Temp:  [97.8 °F (36.6 °C)-98.3 °F (36.8 °C)] 98.3 °F (36.8 °C)  Heart Rate:  [] 68  Resp:  [16-18] 18  BP: ()/() 87/53    Physical Exam:  Physical Exam  Vitals and nursing note reviewed.   Constitutional:       Appearance: She is well-developed.   HENT:      Head: Normocephalic and atraumatic.   Cardiovascular:      Rate and Rhythm: Normal rate.   Pulmonary:      Effort: Pulmonary effort is normal.   Abdominal:      General: Bowel sounds are normal. There is no distension.      Palpations: Abdomen is soft.      Tenderness: There is no abdominal tenderness.   Skin:     General: Skin is warm and dry.   Neurological:      Mental Status: She is alert and oriented to person, place, and time.   Psychiatric:         Behavior: Behavior normal.         Thought Content: Thought content normal.         Judgment: Judgment normal.         Labs:  Results from last 7 days   Lab Units 06/05/22  0135   WBC 10*3/mm3 11.53*   HEMOGLOBIN g/dL 12.2   HEMATOCRIT % 35.6   PLATELETS 10*3/mm3 196         Hospital problem list:    Pregnancy      Assessment & Plan     1. Intrauterine pregnancy at 39w6d    2.  PROM      Plan:  Pitocin augmentation anticipate vaginal delivery      Reviewed procedure with patient.  I discussed the risks including but not limited to bleeding, infection and damage to  internal organs.  Understanding of the procedure is voiced.     Devonte Fung MD  06/05/22  11:20 EDT      Patient Care Team:  Martin Orta MD as Gynecologist (Obstetrics and Gynecology)

## 2022-06-05 NOTE — OBED NOTES
[facility] Note OB        Patient Name: Mo Castro  YOB: 1990  MRN: [id number]  Admission Date: 2022 12:22 AM  Date of Service: 2022    Chief Complaint: Rupture of Membranes (Leaking started at 1630 with bir gush at 2330.  Clear fluid.  Rare contraction.  +FM)        Subjective     Mo Castro is a 32 y.o. female  at 39w6d with Estimated Date of Delivery: 22 who presents with the chief complaint listed above.  She sees Ollie Shaw MD for her prenatal care. Her pregnancy has been complicated by: No prenatal complications.  Patient states that she started leaking fluid around 4:30 PM this afternoon and then noticed a large gush around 11:30 PM.  She is experiencing mild contractions.  He is feeling normal fetal movement.  She denies vaginal bleeding.    S        Objective   Patient Active Problem List    Diagnosis    • Supervision of normal first pregnancy, antepartum [Z34.00]    • Migraine [G43.909]         OB History    Para Term  AB Living   1 0 0 0 0 0   SAB IAB Ectopic Molar Multiple Live Births   0 0 0 0 0 0      # Outcome Date GA Lbr Jose/2nd Weight Sex Delivery Anes PTL Lv   1 Current                 Past Medical History:   Diagnosis Date   • Headache        Past Surgical History:   Procedure Laterality Date   • TONSILLECTOMY         No current facility-administered medications on file prior to encounter.     Current Outpatient Medications on File Prior to Encounter   Medication Sig Dispense Refill   • Prenat-Fe Poly-Methfol-FA-DHA (Vitafol Ultra) 29-0.6-0.4-200 MG capsule Take 200 mg by mouth Daily. 30 capsule 11   • promethazine (PHENERGAN) 25 MG tablet Take 1 tablet by mouth Every 6 (Six) Hours As Needed for Nausea or Vomiting. 25 tablet 0       Allergies   Allergen Reactions   • Amoxicillin Hives   • Grass Hives       Family History   Problem Relation Age of Onset   • Hyperlipidemia Father    • Breast cancer Maternal Grandmother 68   • Ovarian cancer  Maternal Grandmother 70   • Lymphoma Paternal Grandmother    • Hyperlipidemia Paternal Grandmother    • Other Mother         BRCA negative - full panel neg        Social History     Socioeconomic History   • Marital status:      Spouse name: Brian   Tobacco Use   • Smoking status: Never Smoker   • Smokeless tobacco: Never Used   Vaping Use   • Vaping Use: Never used   Substance and Sexual Activity   • Alcohol use: Not Currently     Alcohol/week: 2.0 standard drinks     Types: 2 Glasses of wine per week   • Drug use: No   • Sexual activity: Yes     Partners: Male     Birth control/protection: Pill           Review of Systems   Constitutional: Negative for chills, fatigue and fever.   HENT: Negative.    Eyes: Negative for photophobia and visual disturbance.   Respiratory: Negative for cough, chest tightness and shortness of breath.    Cardiovascular: Negative for chest pain and leg swelling.   Gastrointestinal: Positive for abdominal pain ( Occasional intermittent abdominal pain consistent with mild irregular contractions). Negative for diarrhea, nausea and vomiting.   Genitourinary: Positive for vaginal discharge ( Large gush of fluid at 11:30 PM, leaking small amount of fluid since 4:30 PM). Negative for dysuria, flank pain, hematuria, pelvic pain and vaginal bleeding.   Musculoskeletal: Negative for back pain.   Neurological: Negative for dizziness, seizures, weakness and headaches.          PHYSICAL EXAM:      VITAL SIGNS:  Vitals:    06/04/22 2330 06/05/22 0024   Resp: 18    Temp: 98 °F (36.7 °C)    TempSrc: Oral    Weight:  77.1 kg (170 lb)        FHT'S:                   Baseline: 130-135  BPM  Variability:  Moderate = 6 - 25 BPM  Accelerations:  15 x 15 accelerations present     Decelerations:  absent  Contractions:   Mild irregular contractions present     Interpretation:   Reactive NST, CAT 1 tracing        PHYSICAL EXAM:    General: well developed; well nourished  no acute distress   Heart: Not  performed.   Lungs: breathing is unlabored     Abdomen: soft, non-tender; no masses  no umbilical or inguinal hernias are present       Cervix: was examined by nurse , copious pooling present, nitrazine positive  Cervical Dilation (cm): 1-2  Cervical Effacement: 80%  Fetal Station: -2  Cervical Consistency: soft  Cervical Position: anterior   Contractions: irregular        Extremities: peripheral pulses normal, no pedal edema, no clubbing or cyanosis      LABS AND TESTING ORDERED:  1. Uterine and fetal monitoring  2. Nitrazine testing    LAB RESULTS:    No results found for this or any previous visit (from the past 24 hour(s)).    Lab Results   Component Value Date    ABO O 11/15/2021    RH Positive 11/15/2021       Lab Results   Component Value Date    STREPGPB Negative 2022                 Assessment & Plan     ASSESSMENT/PLAN:  Mo Castro is a 32 y.o. female  at 39w6d who presented with possible rupture of membranes.   She was evaluated for ROM and was found to have a Positive Nitrazine and Pooling present and her cervix was noted to be Cervical Dilation (cm): 1-2 cm/ Cervical Effacement: 80% % effaced/ vertex at Fetal Station: -2 station on last exam.  She was noted to have a Reactive NST.  CAT 1 tracing.  In view of these findings she will be admitted for delivery by Dr Ollie Shaw MD, who will assume care and place orders at this time.      Final Impression:  • Pregnancy at 39w6d  •   • Reactive NST.  CAT 1 tracing  • Confirmed ROM  with Positive Nitrazine and Pooling present  • Contractions: irregular  Lab Results   Component Value Date    ABO O 11/15/2021    RH Positive 11/15/2021   •   Lab Results   Component Value Date    STREPGPB Negative 2022   •   • COVID - 19 status pending        PLAN:  • Patient will be admitted to Dr. Ollie Shaw MD due to confirmed rupture of membranes and Dr. Ollie Shaw MD will assume care of the patient at this time and provide further orders and  management      I have spent 30 minutes including face to face time with the patient, greater than 50% in discussion of the diagnosis (counseling) and/or coordination of care.     Riccardo Tran MD  6/5/2022  00:57 EDT  OB Hospitalist  Phone:    [phone]

## 2022-06-05 NOTE — ANESTHESIA PROCEDURE NOTES
Labor Epidural      Patient location during procedure: OB  Performed By  Anesthesiologist: Walker Bejarano MD  Preanesthetic Checklist  Completed: patient identified, IV checked, site marked, risks and benefits discussed, surgical consent, monitors and equipment checked, pre-op evaluation and timeout performed  Prep:  Pt Position:sitting  Sterile Tech:cap, gloves and mask  Prep:chlorhexidine gluconate and isopropyl alcohol  Monitoring:blood pressure monitoring, continuous pulse oximetry and EKG  Epidural Block Procedure:  Approach:midline  Guidance:landmark technique and palpation technique  Location:L4-L5  Needle Type:Tuohy  Needle Gauge:18 G  Loss of Resistance Medium: saline  Loss of Resistance: 6cm  Cath Depth at skin:14 cm  Paresthesia: none  Aspiration:negative  Test Dose:negative  Post Assessment:  Dressing:occlusive dressing applied and secured with tape  Pt Tolerance:patient tolerated the procedure well with no apparent complications  Complications:no

## 2022-06-05 NOTE — L&D DELIVERY NOTE
[facility]   Obstetrics and Gynecology       Vaginal Delivery Note    2022    Patient:Mo Castro    MR#:[id number]    32 y.o. yo female  at 39w6d    Pre-delivery diagnosis:  Intrauterine pregnancy at 39w6d  PROM    Post-delivery diagnosis:  same      Patient Active Problem List   Diagnosis    Migraine    Supervision of normal first pregnancy, antepartum    Pregnancy     (normal spontaneous vaginal delivery)       Delivery     Delivery: Vaginal, Spontaneous     YOB: 2020    Time of Birth: 4:10 PM      Anesthesia: Epidural     Delivering clinician: Devonte Fung    Forceps?   No   Vacuum? No    Shoulder dystocia present: No        Delivery narrative:  The patient is admitted for SROM.  Pitocin was started per protocol.  The patient entered an active phase of labor and progressed along a normal labor curve to complete dilatation at +2 station.    The fetal tracing remained predominantly category I with brief and limited category II changes.    With 15 minutes of pushes, patient delivered a live viable female infant occiput anterior with restitution to occiput right.  The anterior and posterior shoulder delivered without difficulty.  No nuchal cord was identified. The body was delivered atraumatically.  The infant's nose and oropharynx were suctioned and cleared of secretions.  Cord clamping was delayed a minimum of 30 seconds then was clamped and cut.  The infant was placed on the mom's chest for bonding and care.    There was excessive tenting of the periurethral azevedo with pushing so a small 2 cm episiotomy was made to reduce/prevent injury.  The episiotomy extended slight to a 3.5 cm 2nd degree laceration.    Repair was made with 3-0 chromic in the standard fashion.  There as a right vaginal sidewall laceration repaired as well     The placenta was expressed and delivered intact.      EBL:    mL  Quant EBL:  Quantitative Blood Loss (mL): 407 mL      Summary:   Uncomplicated Vaginal delivery      Infant    Findings: female  infant     Infant observations: Weight: 3220 g (7 lb 1.6 oz)     Observations/Comments:  Scale 1      Apgars: 8  @ 1 minute /    9  @ 5 minutes   Infant Name: Kaci Brown     Placenta, Cord, and Fluid    Placenta delivered  Expressed  at  6/5/2022  4:14 PM     Cord: 3 vessels  present.   Nuchal Cord?  no   Cord blood obtained: Yes    Cord gases obtained:  Yes    Cord gas results: pH, Cord Venous   Date Value Ref Range Status   06/05/2022 7.353 7.260 - 7.400 pH Units Final            Repair    Episiotomy: No   Lacerations: Yes  Laceration Information  Laceration Repaired?   Perineal: 2nd  Yes    Periurethral:       Labial:       Sulcus:       Vaginal: Yes  Yes    Cervical:         Suture used for repair: 3-0 chromic gut   Estimated Blood Loss:   mL         Complications  none    Disposition  Mother to Mother Baby/Postpartum  in stable condition currently.  Baby to NBN  in stable condition currently.    [x]  Labs reviewed:  Bld O pos   Rubella Imm  Varicella + hx     Immunization History   Administered Date(s) Administered    COVID-19 (PFIZER) PURPLE CAP 09/13/2021, 10/04/2021    FluLaval/Fluarix/Fluzone >6 12/13/2021    Tdap 03/07/2022         Devonte Fung MD  06/05/22  21:36 EDT

## 2022-06-05 NOTE — ANESTHESIA PREPROCEDURE EVALUATION
Anesthesia Evaluation     Patient summary reviewed and Nursing notes reviewed   NPO Solid Status: > 8 hours  NPO Liquid Status: > 2 hours           Airway   Mallampati: II  TM distance: >3 FB  Neck ROM: full  no difficulty expected  Dental - normal exam     Pulmonary - negative pulmonary ROS and normal exam   (-) decreased breath sounds, wheezes  Cardiovascular - normal exam  Exercise tolerance: good (4-7 METS)    (-) hypertension      Neuro/Psych- negative ROS  (-) seizures, CVA  GI/Hepatic/Renal/Endo - negative ROS   (-) diabetes    Musculoskeletal (-) negative ROS    Abdominal  - normal exam   Substance History - negative use  (-) alcohol use, drug use     OB/GYN    (+) Pregnant,         Other - negative ROS                       Anesthesia Plan    ASA 2     epidural       Anesthetic plan, all risks, benefits, and alternatives have been provided, discussed and informed consent has been obtained with: patient.        CODE STATUS:

## 2022-06-05 NOTE — ANESTHESIA POSTPROCEDURE EVALUATION
"Patient: Mo Castro    Procedure Summary     Date: 06/05/22 Room / Location:     Anesthesia Start: 0718 Anesthesia Stop: 1610    Procedure: LABOR ANALGESIA Diagnosis:     Scheduled Providers:  Provider: Walker Bejarano MD    Anesthesia Type: epidural ASA Status: 2          Anesthesia Type: epidural    Vitals  Vitals Value Taken Time   /109 06/05/22 1701   Temp 36.6 °C (97.8 °F) 06/05/22 1125   Pulse 123 06/05/22 1701   Resp 16 06/05/22 1125   SpO2 99 % 06/05/22 1700   Vitals shown include unvalidated device data.        Post Anesthesia Care and Evaluation    Patient location during evaluation: bedside  Patient participation: complete - patient participated  Level of consciousness: awake and alert  Pain management: adequate  Airway patency: patent  Anesthetic complications: No anesthetic complications    Cardiovascular status: acceptable  Respiratory status: acceptable  Hydration status: acceptable    Comments: BP 94/46   Pulse 76   Temp 36.6 °C (97.8 °F) (Oral)   Resp 16   Ht 167.6 cm (66\")   Wt 77.1 kg (170 lb)   LMP 09/04/2021 (Approximate) Comment: cycle 26-27 days  SpO2 99%   Breastfeeding Yes   BMI 27.44 kg/m²       "

## 2022-06-06 LAB
BASOPHILS # BLD AUTO: 0.04 10*3/MM3 (ref 0–0.2)
BASOPHILS NFR BLD AUTO: 0.2 % (ref 0–1.5)
DEPRECATED RDW RBC AUTO: 37.4 FL (ref 37–54)
EOSINOPHIL # BLD AUTO: 0.07 10*3/MM3 (ref 0–0.4)
EOSINOPHIL NFR BLD AUTO: 0.4 % (ref 0.3–6.2)
ERYTHROCYTE [DISTWIDTH] IN BLOOD BY AUTOMATED COUNT: 11.7 % (ref 12.3–15.4)
HCT VFR BLD AUTO: 29.5 % (ref 34–46.6)
HGB BLD-MCNC: 10.2 G/DL (ref 12–15.9)
IMM GRANULOCYTES # BLD AUTO: 0.08 10*3/MM3 (ref 0–0.05)
IMM GRANULOCYTES NFR BLD AUTO: 0.5 % (ref 0–0.5)
LYMPHOCYTES # BLD AUTO: 2.16 10*3/MM3 (ref 0.7–3.1)
LYMPHOCYTES NFR BLD AUTO: 13.2 % (ref 19.6–45.3)
MCH RBC QN AUTO: 30.9 PG (ref 26.6–33)
MCHC RBC AUTO-ENTMCNC: 34.6 G/DL (ref 31.5–35.7)
MCV RBC AUTO: 89.4 FL (ref 79–97)
MONOCYTES # BLD AUTO: 0.8 10*3/MM3 (ref 0.1–0.9)
MONOCYTES NFR BLD AUTO: 4.9 % (ref 5–12)
NEUTROPHILS NFR BLD AUTO: 13.22 10*3/MM3 (ref 1.7–7)
NEUTROPHILS NFR BLD AUTO: 80.8 % (ref 42.7–76)
NRBC BLD AUTO-RTO: 0 /100 WBC (ref 0–0.2)
PLATELET # BLD AUTO: 191 10*3/MM3 (ref 140–450)
PMV BLD AUTO: 10.9 FL (ref 6–12)
RBC # BLD AUTO: 3.3 10*6/MM3 (ref 3.77–5.28)
WBC NRBC COR # BLD: 16.37 10*3/MM3 (ref 3.4–10.8)

## 2022-06-06 PROCEDURE — 85025 COMPLETE CBC W/AUTO DIFF WBC: CPT | Performed by: OBSTETRICS & GYNECOLOGY

## 2022-06-06 RX ADMIN — IBUPROFEN 800 MG: 800 TABLET, FILM COATED ORAL at 10:02

## 2022-06-06 RX ADMIN — DOCUSATE SODIUM 100 MG: 100 CAPSULE, LIQUID FILLED ORAL at 10:02

## 2022-06-06 RX ADMIN — IBUPROFEN 800 MG: 800 TABLET, FILM COATED ORAL at 20:26

## 2022-06-06 RX ADMIN — IBUPROFEN 800 MG: 800 TABLET, FILM COATED ORAL at 02:08

## 2022-06-06 RX ADMIN — BENZOCAINE 1 APPLICATION: 5.6 OINTMENT TOPICAL at 02:10

## 2022-06-06 RX ADMIN — Medication: at 10:04

## 2022-06-06 NOTE — LACTATION NOTE
P1T Baby Kaci had her tongue revised tonight . LC attempted to help baby latch when she returned to mom's room but she would only mouth the nipple and went to sleep. Discussed what to watch for as tongue heals . Mom knows that breast milk is healing and was encouraged to express milk into baby's mouth . Baby did nurse for one hour with 24 hour nipple shield on right breast in side-lying this afternoon.   LC # on WB

## 2022-06-06 NOTE — LACTATION NOTE
LC follow-up. Patient is trying to nurse baby Katelyn. Colostrum is easily expressed and nipples are well everted but Baby is fretful and not wanting to sustain a latch. A 24 mm nipple shield was attempted in side lying and baby was finally beginning to show a rhythmic suck pattern. LC will follow up after OP appointment. Patient has her Motif set up at bedside and is encouraged to pump and feed EBM.

## 2022-06-06 NOTE — PROGRESS NOTES
[facility]   Obstetrics and Gynecology     2022    Name:Mo Castro   MR#:[id number]    Vaginal Delivery Progress Note    HD#1    Subjective   Postpartum Day 1: 32 y.o. yo Female  delivered at 39w6d  delivered a female  infant.     The patient feels well.  Her pain is controlled.    She is ambulating well.  Patient describes her bleeding as thin lochia.    Breastfeeding: infant latching with difficulty.     Patient Active Problem List   Diagnosis   • Migraine   • Supervision of normal first pregnancy, antepartum   • Pregnancy   •  (normal spontaneous vaginal delivery)       Objective   Vital Signs Range for the last 24 hours  Temp: Temp:  [97 °F (36.1 °C)-98.9 °F (37.2 °C)] 98.1 °F (36.7 °C) Temp src: Oral   BP: BP: ()/() 103/66        Pulse: Heart Rate:  [] 93  RR: Resp:  [16-18] 16  Weight: 77.1 kg (170 lb)  BMI:  Body mass index is 27.44 kg/m².    Results from last 7 days   Lab Units 22  0135   WBC 10*3/mm3 11.53*   HEMOGLOBIN g/dL 12.2   HEMATOCRIT % 35.6   PLATELETS 10*3/mm3 196           Physical Exam  Vitals and nursing note reviewed.   Constitutional:       General: She is not in acute distress.     Appearance: Normal appearance.   Cardiovascular:      Pulses: Normal pulses.   Pulmonary:      Effort: Pulmonary effort is normal.   Abdominal:      General: There is no distension.      Palpations: Abdomen is soft.      Tenderness: There is no abdominal tenderness. There is no guarding or rebound.   Genitourinary:     Comments: Uterus firm and below umbilicus  Musculoskeletal:         General: No swelling or tenderness.      Right lower leg: No edema.      Left lower leg: No edema.   Neurological:      Mental Status: She is alert and oriented to person, place, and time.   Psychiatric:         Mood and Affect: Mood normal.         Behavior: Behavior normal.         Assessment/Plan   1.  PPD# 1      Plan:   Continue routine postpartum care      Lindsay  Carine Camacho MD  6/6/2022 12:37 EDT

## 2022-06-06 NOTE — PLAN OF CARE
Goal Outcome Evaluation:  Plan of Care Reviewed With: patient, spouse           Outcome Evaluation: Post partum day 1. VSS. Assessment shows hemmorrhoids (given cream and waffle cushion). Pain controlled with motrin. Breastfeeding and pumping. Ambulates independently and voids spontaneously.

## 2022-06-06 NOTE — LACTATION NOTE
P1T. Patient is very emotional today , crying and anxious about breastfeeding going well. Baby Kaci is sleepy and has a short lingual frenulum. She brings her tongue forward for latching and cups nipple to draw it into her mouth but does have some snap-back noted on a gloved finger. Breast massage and hand expression practiced and colostrum can be easily expressed. Patient has been using a manual breast pump and has fed several cc's in syringes. Her Motif pump is at bedside and assembled but she did not want to use it at this time. Enc her to get some sleep when baby sleeps. FOB present and supportive . Asked FOB to help keep patient well hydrated. Will call LC as needed.  Lactation Consult Note    Evaluation Completed: 2022 10:27 EDT  Patient Name: Mo Castro  :  1990  MRN:  [id number]     REFERRAL  INFORMATION:                          Date of Referral: 22   Person Making Referral: patient, nurse  Maternal Reason for Referral: breastfeeding currently, no prior breastfeeding experience, other (see comments) (very emotional and anxious about breastfeeding)       DELIVERY HISTORY:        Skin to skin initiation date/time: 2022  2:12 PM   Skin to skin end date/time: 2022          MATERNAL ASSESSMENT:  Breast Size Issue: none (22 1000)  Breast Shape: round (22 1000)  Breast Density: soft, other (see comments) (good blue veining bi-laterally) (22 1000)  Areola: elastic (22 1000)  Nipples: everted (22 1000)     Left Nipple Symptoms: redness, tender (22 1000)  Right Nipple Symptoms: redness, tender (22 1000)       INFANT ASSESSMENT:  Information for the patient's :  Minnie Castro [[id number]]   No past medical history on file.                                                                                                     MATERNAL INFANT FEEDING:     Maternal Emotional State: anxious, other (see comments) (crying) (22  1000)  Infant Positioning: side-lying (06/06/22 1000)   Signs of Milk Transfer: suck/swallow ratio, transfer present (06/06/22 1000)  Pain with Feeding: no (06/06/22 1000)           Milk Ejection Reflex: present (06/06/22 1000)  Comfort Measures Following Feeding: air-drying encouraged, breast cream/oil applied, expressed milk applied, soap use discouraged (06/06/22 1000)        Latch Assistance: full assistance needed (06/06/22 1000)                               EQUIPMENT TYPE:  Breast Pump Type: double electric, personal, manual pump (06/06/22 1000)                              BREAST PUMPING:  Breast Pumping Interventions: post-feed pumping encouraged (06/06/22 1000)       LACTATION REFERRALS:  Lactation Referrals: outpatient lactation program, support group (06/06/22 1000)

## 2022-06-07 VITALS
WEIGHT: 170 LBS | BODY MASS INDEX: 27.32 KG/M2 | RESPIRATION RATE: 16 BRPM | HEIGHT: 66 IN | TEMPERATURE: 97.8 F | OXYGEN SATURATION: 100 % | DIASTOLIC BLOOD PRESSURE: 71 MMHG | HEART RATE: 67 BPM | SYSTOLIC BLOOD PRESSURE: 106 MMHG

## 2022-06-07 RX ORDER — OXYCODONE HYDROCHLORIDE AND ACETAMINOPHEN 5; 325 MG/1; MG/1
1 TABLET ORAL EVERY 4 HOURS PRN
Qty: 10 TABLET | Refills: 0 | Status: SHIPPED | OUTPATIENT
Start: 2022-06-07 | End: 2022-06-15

## 2022-06-07 RX ORDER — IBUPROFEN 600 MG/1
600 TABLET ORAL EVERY 6 HOURS PRN
Qty: 30 TABLET | Refills: 1 | Status: SHIPPED | OUTPATIENT
Start: 2022-06-07 | End: 2022-07-14

## 2022-06-07 RX ADMIN — IBUPROFEN 800 MG: 800 TABLET, FILM COATED ORAL at 04:25

## 2022-06-07 RX ADMIN — DOCUSATE SODIUM 100 MG: 100 CAPSULE, LIQUID FILLED ORAL at 09:09

## 2022-06-07 NOTE — PROGRESS NOTES
"Consulted to see patient about \"numbness\" on right anterior thigh.  Pt is s/p  day 2 and ready for DC today.  Pt is ambulating well with no motor issues in right leg.  Neuro exam bilaterally is 5/5 strength and equal.  Clear \"numb\" spot per patient on right anterior thigh, from below inguinal area to top of knee.  States \"slightly numb\" to touch, but no \"pins or needles or pain\".  Can still feel touch in that area.  Epidural placement on 22 was unremarkable.  Pt is a  and states she doesn't think its from the anesthesia meds because they would have worn off by 2 days later.  Agreed with her assessment and discussed that its likely from positioning from vaginal delivery and should be self-limiting.  Informed her to contact primary OB office if it hasn't resolved or worsens in the next 5-7 days.    Michael Abebe MD  "

## 2022-06-07 NOTE — PROGRESS NOTES
"Postpartum Vaginal Delivery Note PPD# 2    CC:  PPD 2    Assessment:     Pt doing well. Pain well controlled. Lochia normal. Ambulating well. Tolerating regular diet. Voiding without difficulty.   Right thigh still numb.  Will get anesthesia to consult.     Review of Systems - Negative except thigh numb and cramping    Vitals:   /71 (BP Location: Left arm, Patient Position: Sitting)   Pulse 67   Temp 97.8 °F (36.6 °C) (Oral)   Resp 16   Ht 167.6 cm (66\")   Wt 77.1 kg (170 lb)   LMP 09/04/2021 (Approximate) Comment: cycle 26-27 days  SpO2 100%   Breastfeeding Yes   BMI 27.44 kg/m²       Exam:   Neck:  No LAD or TM  Lungs:  Non labored breathing, no tachypnea  Gen: NAD, cooperative, conversive  Abd: Soft, nondistended, fundus is firm below umbillicus, approp tender  Ext: Nontender bilaterally, edema- trace    Labs:  Lab Results (last 24 hours)     Procedure Component Value Units Date/Time    CBC & Differential [[id number]]  (Abnormal) Collected: 06/06/22 1204    Specimen: Blood Updated: 06/06/22 1340    Narrative:      The following orders were created for panel order CBC & Differential.  Procedure                               Abnormality         Status                     ---------                               -----------         ------                     CBC Auto Differential[[id number]]        Abnormal            Final result                 Please view results for these tests on the individual orders.    CBC Auto Differential [[id number]]  (Abnormal) Collected: 06/06/22 1204    Specimen: Blood Updated: 06/06/22 1340     WBC 16.37 10*3/mm3      RBC 3.30 10*6/mm3      Hemoglobin 10.2 g/dL      Hematocrit 29.5 %      MCV 89.4 fL      MCH 30.9 pg      MCHC 34.6 g/dL      RDW 11.7 %      RDW-SD 37.4 fl      MPV 10.9 fL      Platelets 191 10*3/mm3      Neutrophil % 80.8 %      Lymphocyte % 13.2 %      Monocyte % 4.9 %      Eosinophil % 0.4 %      Basophil % 0.2 %      Immature Grans % 0.5 %      " Neutrophils, Absolute 13.22 10*3/mm3      Lymphocytes, Absolute 2.16 10*3/mm3      Monocytes, Absolute 0.80 10*3/mm3      Eosinophils, Absolute 0.07 10*3/mm3      Basophils, Absolute 0.04 10*3/mm3      Immature Grans, Absolute 0.08 10*3/mm3      nRBC 0.0 /100 WBC           Assessment:  PPD 2 s/p vaginal delivery  Patient Active Problem List   Diagnosis   • Migraine   • Supervision of normal first pregnancy, antepartum   • Pregnancy   •  (normal spontaneous vaginal delivery)       Plan: Mokavita Castro is a 32 y.o. female  s/p  Vaginal delivery- PPD 2    1. Doing well, d/c home today  2. Precautions given  3. RTC in 6 weeks.   4. Ambulation encouraged.         Signed By:  Lola Castro MD       2022 09:58 EDT

## 2022-06-07 NOTE — LACTATION NOTE
Lactation Consult Note  Mother called for help with BF. Reported baby has been very sleepy.  Assisted mother in waking up the baby and in latching her in a cross cradle position to the left breast. Educated mom starting nose to nipple to obtain deep latch and baby was able to achieve it after few attempts and some suck training. Infant is latching well, has nutritive suckle, and has a good jaw rotation, but is falling asleep easily. Discussed ways to keep baby awake during BF. PT declines any questions and concerns at this time. Encouraged to call LC if needing further assistance.        Evaluation Completed: 2022 22:33 EDT  Patient Name: Mo Castro  :  1990  MRN:  [id number]     REFERRAL  INFORMATION:                          Date of Referral: 22   Person Making Referral: patient  Maternal Reason for Referral: breastfeeding currently  Infant Reason for Referral: sleepy    DELIVERY HISTORY:        Skin to skin initiation date/time: 2022  2:12 PM   Skin to skin end date/time: 2022          MATERNAL ASSESSMENT:  Breast Size Issue: none (22)  Breast Shape: Bilateral:, round (22)  Breast Density: Bilateral:, soft (22)  Areola: Bilateral:, elastic (22)  Nipples: Bilateral:, everted, graspable (22)     Left Nipple Symptoms: redness, tender (22)  Right Nipple Symptoms: redness, tender (22)       INFANT ASSESSMENT:  Information for the patient's :  Minnie Castro [[id number]]   No past medical history on file.     Feeding Readiness Cues: sleeping (22)                     Feeding Interventions: arousal required, lips stroked, latch assistance provided, sucking promoted (22)               Breastfeeding: breastfeeding, left side only (22)   Infant Positioning: cross-cradle (22)         Effective Latch During Feeding: yes (22)   Suck/Swallow/Breathing  Coordination: present (22)   Signs of Milk Transfer: deep jaw excursions noted (22)       Latch: 1-->repeated attempts, holds nipple in mouth, stimulate to suck (22)   Audible Swallowin-->a few with stimulation (22)   Type of Nipple: 2-->everted (after stimulation) (22)   Comfort (Breast/Nipple): 2-->soft/nontender (22)   Hold (Positioning): 1-->minimal assist, teach one side, mother does other, staff holds (22)   Latch Score: 7 (22)                    MATERNAL INFANT FEEDING:     Maternal Emotional State: receptive, relaxed (22)  Infant Positioning: cross-cradle (22)   Signs of Milk Transfer: deep jaw excursions noted (22)  Pain with Feeding: no (22)           Milk Ejection Reflex: present (22)           Latch Assistance: minimal assistance (22)                               EQUIPMENT TYPE:                                 BREAST PUMPING:          LACTATION REFERRALS:

## 2022-06-07 NOTE — LACTATION NOTE
Mom wanting assistance with latching baby to right breast. Assisted mom with latching baby. Educated mom on importance of deep latching and ways to achieve it. Mom was able to express colostrum on nipple. Baby is latching well at this time in football position. Educated mom on cluster feeding, baby's expected output and weight gain. Mom has [facility] info if needing f/u    Lactation Consult Note    Evaluation Completed: 2022 10:05 EDT  Patient Name: Mo Castro  :  1990  MRN:  [id number]     REFERRAL  INFORMATION:                          Date of Referral: 22   Person Making Referral: patient  Maternal Reason for Referral: breastfeeding currently  Infant Reason for Referral: sleepy    DELIVERY HISTORY:        Skin to skin initiation date/time: 2022  2:12 PM   Skin to skin end date/time: 2022          MATERNAL ASSESSMENT:                               INFANT ASSESSMENT:  Information for the patient's :  Minnie Castro [[id number]]   No past medical history on file.                                                                                                     MATERNAL INFANT FEEDING:                                                                       EQUIPMENT TYPE:                                 BREAST PUMPING:          LACTATION REFERRALS:

## 2022-06-07 NOTE — DISCHARGE SUMMARY
[facility]  Delivery Discharge Summary    Discharge Diagnosis:     1. Vaginal delivery      Patient Active Problem List   Diagnosis   • Migraine   • Supervision of normal first pregnancy, antepartum   • Pregnancy   •  (normal spontaneous vaginal delivery)        Primary OB Clinician: You    EDC: Estimated Date of Delivery: 22    Gestational Age:39w6d    Antepartum complications: none    Date of Delivery: 2022   Time of Delivery: 4:10 PM     Delivered By:  Devonte Fung     Delivery Type: Vaginal, Spontaneous      Tubal Ligation: n/a    Baby: female infant;   Apgar:  8  @ 1 minute /   Apgar:  9  @ 5 minutes        Anesthesia: Epidural      Intrapartum complications: None    Laceration: Yes  Laceration Information  Laceration Repaired?   Perineal: 2nd  Yes    Periurethral:       Labial:       Sulcus:       Vaginal: Yes  Yes    Cervical:             Episiotomy: No    Placenta: Expressed     Feeding method: Breastfeeding Status: Yes    Rh Immune globulin given: not applicable    Rubella vaccine given: no    Discharge Date: 2022; Discharge Time: 10:04 EDT    Early Discharge:  NO  Hospital Course:  The patient was admitted following delivery.  She did well postpartum with normal return of bowel function and remained afebrile.    Blood type: O+  Rubella immune  dTap given prenatally        Plan:    Follow-up appointment with Dr Orta in 6 weeks.

## 2022-06-08 ENCOUNTER — TELEPHONE (OUTPATIENT)
Dept: LACTATION | Facility: HOSPITAL | Age: 32
End: 2022-06-08

## 2022-06-08 ENCOUNTER — POSTPARTUM VISIT (OUTPATIENT)
Dept: OBSTETRICS AND GYNECOLOGY | Age: 32
End: 2022-06-08

## 2022-06-08 ENCOUNTER — TELEPHONE (OUTPATIENT)
Dept: OBSTETRICS AND GYNECOLOGY | Age: 32
End: 2022-06-08

## 2022-06-08 VITALS
WEIGHT: 161.4 LBS | SYSTOLIC BLOOD PRESSURE: 112 MMHG | HEIGHT: 66 IN | BODY MASS INDEX: 25.94 KG/M2 | DIASTOLIC BLOOD PRESSURE: 74 MMHG

## 2022-06-08 DIAGNOSIS — Z13.89 SCREENING FOR BLOOD OR PROTEIN IN URINE: Primary | ICD-10-CM

## 2022-06-08 DIAGNOSIS — K59.09 OTHER CONSTIPATION: ICD-10-CM

## 2022-06-08 DIAGNOSIS — N39.41 URGE INCONTINENCE OF URINE: ICD-10-CM

## 2022-06-08 LAB
BILIRUB BLD-MCNC: NEGATIVE MG/DL
CLARITY, POC: ABNORMAL
COLOR UR: YELLOW
GLUCOSE UR STRIP-MCNC: NEGATIVE MG/DL
KETONES UR QL: NEGATIVE
LEUKOCYTE EST, POC: NEGATIVE
NITRITE UR-MCNC: NEGATIVE MG/ML
PH UR: 7 [PH] (ref 5–8)
PROT UR STRIP-MCNC: ABNORMAL MG/DL
RBC # UR STRIP: ABNORMAL /UL
SP GR UR: 1.02 (ref 1–1.03)
UROBILINOGEN UR QL: NORMAL

## 2022-06-08 PROCEDURE — 99213 OFFICE O/P EST LOW 20 MIN: CPT | Performed by: PHYSICIAN ASSISTANT

## 2022-06-08 PROCEDURE — 81003 URINALYSIS AUTO W/O SCOPE: CPT | Performed by: PHYSICIAN ASSISTANT

## 2022-06-08 RX ORDER — CEPHALEXIN 500 MG/1
500 CAPSULE ORAL 4 TIMES DAILY
Qty: 40 CAPSULE | Refills: 0 | Status: SHIPPED | OUTPATIENT
Start: 2022-06-08 | End: 2022-06-16

## 2022-06-08 NOTE — PROGRESS NOTES
Patient presents for a postpartum visit. She is 3 days postpartum following a spontaneous vaginal delivery. I have fully reviewed the prenatal and intrapartum course. The delivery was at 39 gestational weeks. Outcome: spontaneous vaginal delivery. Anesthesia: epidural. Postpartum course has been rough. Baby's course has been good. Baby is feeding by breast. Bleeding moderate lochia. Bowel function is abnormal: constipation and pain. Bladder function is abnormal: incontinence and urinating frequently. Patient is not sexually active. Contraception method is abstinence. Postpartum depression screening: negative.    Pt delivered Sunday   Had a 2nd degree perineal laceration was made during delivery  She does note that when she was in the hospital she had a couple episodes of urinary incontinence that she alerted the nurse to this but was told it was nothing to worry about  She also noted significant perineal pain and discomfort to the point that she had to leave the hospital on a stretcher  She is taking motrin  Using ice packs and all other resources that were provided to her  Is having a lot of difficulty having a BM b/c of the pain and having pressure b/c of th is  Last BM was 2 days ago  Is using stool softener tid    The following portions of the patient's history were reviewed and updated as appropriate: allergies, current medications, past family history, past medical history, past social history, past surgical history and problem list.    Review of Systems  Genitourinary:positive for constipation, frequency and urinary incontinence        Vitals:    06/08/22 1602   BP: 112/74       General:  alert, appears stated age, cooperative and mildly uncomfortable when sitting    Breasts:  NA   Lungs: NA   Heart:  NA   Abdomen: NA    Vulva:  positive for laceration n   Vagina: vaginal exam was done with extreme care to patient and her laceration. I see and feel no fissures or opening b/t the vagina and rectal walls  and urethra appears normal with no openings or incision noted. mild erytheman not in right gluteal region, perineal sutures are in place and tissue is approximating well. appropriiately tender to palpation. no s/s of infection   Cervix:  NA   Corpus: not examined   Adnexa:  not evaluated   Rectal Exam: Normal rectovaginal exam         3 day postpartum exam. Pap smear not done at today's visit.    1. Contraception: abstinence  2. Start keflex for likely UTI. Would also recommend motrin and tylenol alternating for pain control. C/w stool softener and add miralax. Could also benefit from glycerine suppository to help with BM's. Cool compresses to perirectal area. F/u in 2 days. If sx's worsen, would recommend f/u in pp  3. Follow up in: 2 days or as needed.

## 2022-06-08 NOTE — TELEPHONE ENCOUNTER
Mom called because she is having difficulty breast feeding and is in pain, slightly engorged, reports intact nipples. Appointment for [facility] made for tomorrow. Discussed pumping every 3hrs, feeding all EBM to baby and possibly formula supplements afterwards until seen tomorrow.

## 2022-06-09 ENCOUNTER — HOSPITAL ENCOUNTER (OUTPATIENT)
Dept: LACTATION | Facility: HOSPITAL | Age: 32
Discharge: HOME | End: 2022-06-09

## 2022-06-09 NOTE — PROGRESS NOTES
Sounds like patient is having a lot of of perineal pain.  She can continue to use ice to the area.  She could also use some hydrocortisone cream where the stitches are that may lower inflammation.

## 2022-06-09 NOTE — LACTATION NOTE
Mom wanting assistance with latching baby. Her milk is in. She has been pumping and bottle feeding baby since yesterday. She is pumping 2 oz and baby is taking all of it. Assisted mom with latching baby today. Educated on importance of deep latching and ways to achieve it. Educated on reverse pressure softening and breast massage. Baby is latching great today and mom denies pain.     Today's weight 6-14.9  After BF for 6 min on right breast and 5 min on left breast, baby weighed 7-0.2  Transfer 1.3 oz   baby went back to breast after dad dressed her. F/u as needed    Lactation Consult Note    Evaluation Completed: 2022 10:25 EDT  Patient Name: oM Castro  :  1990  MRN:  [id number]     REFERRAL  INFORMATION:                          Date of Referral: 22      Maternal Reason for Referral: latch difficulty         MATERNAL ASSESSMENT:     Breast Shape: round (22)  Breast Density: full (22)  Areola: elastic (22)  Nipples: everted (22)                MATERNAL INFANT FEEDING:     Maternal Emotional State: relaxed (22 1000)  Infant Positioning: cross-cradle (22)   Signs of Milk Transfer: deep jaw excursions noted, breasts soften with feeding, audible swallow (22)  Pain with Feeding: no (22)                       Latch Assistance: minimal assistance (22)                           Feeding Readiness Cues: eager (22)        Effective Latch During Feeding: yes (22)  Suck/Swallow/Breathing Coordination: present (22 1000)     Prefeeding Weight (gm): 3144 g (110.9 oz) (22 1000)  Postfeeding Weight (gm): 3180 g (112.2 oz) (22)  Weight Gain/Loss (gm) : 36 g (1.3 oz) (22)      Latch: 2-->grasps breast, tongue down, lips flanged, rhythmic sucking (22 1000)  Audible Swallowin-->spontaneous and intermittent (24 hrs old) (22 1000)  Type of Nipple:  2-->everted (after stimulation) (06/09/22 1000)  Comfort (Breast/Nipple): 2-->soft/nontender (06/09/22 1000)  Hold (Positioning): 1-->minimal assist, teach one side, mother does other, staff holds (06/09/22 1000)  Latch Score: 9 (06/09/22 1000)      EQUIPMENT TYPE:                                 BREAST PUMPING:  Breast Pumping Interventions:  (as needed) (06/09/22 1000)       LACTATION REFERRALS:

## 2022-06-10 ENCOUNTER — TELEPHONE (OUTPATIENT)
Dept: OBSTETRICS AND GYNECOLOGY | Age: 32
End: 2022-06-10

## 2022-06-10 ENCOUNTER — POSTPARTUM VISIT (OUTPATIENT)
Dept: OBSTETRICS AND GYNECOLOGY | Age: 32
End: 2022-06-10

## 2022-06-10 VITALS
DIASTOLIC BLOOD PRESSURE: 90 MMHG | HEIGHT: 66 IN | SYSTOLIC BLOOD PRESSURE: 140 MMHG | WEIGHT: 157.8 LBS | BODY MASS INDEX: 25.36 KG/M2

## 2022-06-10 DIAGNOSIS — R10.2 PERINEAL PAIN: Primary | ICD-10-CM

## 2022-06-10 LAB
BACTERIA UR CULT: NORMAL
BACTERIA UR CULT: NORMAL

## 2022-06-10 PROCEDURE — 99213 OFFICE O/P EST LOW 20 MIN: CPT | Performed by: OBSTETRICS & GYNECOLOGY

## 2022-06-10 RX ORDER — LIDOCAINE 50 MG/G
1 OINTMENT TOPICAL
Qty: 30 G | Refills: 1 | Status: SHIPPED | OUTPATIENT
Start: 2022-06-10 | End: 2022-07-14

## 2022-06-10 RX ORDER — LABETALOL 100 MG/1
100 TABLET, FILM COATED ORAL 2 TIMES DAILY
Qty: 60 TABLET | Refills: 1 | Status: SHIPPED | OUTPATIENT
Start: 2022-06-10 | End: 2022-06-16

## 2022-06-10 RX ORDER — ACETAMINOPHEN 325 MG/1
650 TABLET ORAL EVERY 6 HOURS PRN
COMMUNITY
End: 2022-07-14

## 2022-06-10 RX ORDER — OXYCODONE HYDROCHLORIDE AND ACETAMINOPHEN 5; 325 MG/1; MG/1
1 TABLET ORAL EVERY 4 HOURS PRN
Qty: 10 TABLET | Refills: 0 | Status: SHIPPED | OUTPATIENT
Start: 2022-06-10 | End: 2022-07-14

## 2022-06-10 NOTE — TELEPHONE ENCOUNTER
Pt has PP visit today at 3:00, She talked to  last night because her ankles had swelled up,  advised her to call first thing this morning and see if swelling has worsened, Pt says her ankles are 2x the size and she is having a stabbing pain in her perineum, Please advise if pt should come in earlier today.

## 2022-06-10 NOTE — PROGRESS NOTES
"  Chief complaint-perineal pain    History of present illness- Patient is a 32 y.o.  who complains of sharp severe pain in the right perineum.  Patient has been trying ice and hydrocortisone.  She is only taking Tylenol and Motrin.  She was worried about trying narcotics due to risk of constipation.  She did have to take MiraLAX.  She is also noted some anterior thigh numbness.  She has had some edema in her lower legs.  Patient has an episiotomy that extended to second-degree laceration.  She also had a right vaginal tear.  She does not having any headaches or visual changes.        /90   Ht 167.6 cm (66\")   Wt 71.6 kg (157 lb 12.8 oz)   Breastfeeding Yes   BMI 25.47 kg/m²   Physical Exam  Constitutional:       Comments: Patient moves very slowly on the exam table.  She appears mildly uncomfortable but is alert and answers questions well.   Genitourinary:      Genitourinary Comments: There is bruising on the right labia majora.  Patient's repair is intact.  There is no hematoma palpated on vaginal exam.  Rectal exam is normal with good sphincter tone and no masses are palpated.   Psychiatric:         Mood and Affect: Mood normal.         Thought Content: Thought content normal.         Judgment: Judgment normal.     Lower extremities show trace edema only.        Diagnoses and all orders for this visit:    1. Perineal pain (Primary)  -     oxyCODONE-acetaminophen (Percocet) 5-325 MG per tablet; Take 1 tablet by mouth Every 4 (Four) Hours As Needed for Severe Pain .  Dispense: 10 tablet; Refill: 0    2. Postpartum hypertension    Other orders  -     labetalol (NORMODYNE) 100 MG tablet; Take 1 tablet by mouth 2 (Two) Times a Day.  Dispense: 60 tablet; Refill: 1  -     lidocaine (XYLOCAINE) 5 % ointment; Apply 1 application topically to the appropriate area as directed Every 2 (Two) Hours As Needed for Mild Pain .  Dispense: 30 g; Refill: 1  -     Hydrocort-Pramoxine, Perianal, (PROCTOFOAM-HS) 1-1 % " rectal foam; Insert 1 application into the rectum 2 (Two) Times a Day.  Dispense: 10 g; Refill: 1    Patient has borderline blood pressure without symptoms.  We will start a low-dose of labetalol and she will check blood pressure at home.  She will follow-up with me in 1 week  Patient is having significant perineal pain.  She does have some ecchymosis but no hematoma.  We will try lidocaine and Proctofoam.  She can also try ice to the area.  She will take Percocet if pain is not controlled.  Discussed risk of constipation.  Also recommend sitz bath's.  Patient was on Keflex for possible UTI but urine culture has come back negative.  She can stop antibiotics.

## 2022-06-16 ENCOUNTER — POSTPARTUM VISIT (OUTPATIENT)
Dept: OBSTETRICS AND GYNECOLOGY | Age: 32
End: 2022-06-16

## 2022-06-16 VITALS
WEIGHT: 157 LBS | BODY MASS INDEX: 25.23 KG/M2 | HEIGHT: 66 IN | DIASTOLIC BLOOD PRESSURE: 70 MMHG | SYSTOLIC BLOOD PRESSURE: 112 MMHG

## 2022-06-16 DIAGNOSIS — R10.2 PERINEAL PAIN: Primary | ICD-10-CM

## 2022-06-16 PROBLEM — Z34.90 PREGNANCY: Status: RESOLVED | Noted: 2022-06-05 | Resolved: 2022-06-16

## 2022-06-16 PROBLEM — Z34.00 SUPERVISION OF NORMAL FIRST PREGNANCY, ANTEPARTUM: Status: RESOLVED | Noted: 2021-11-15 | Resolved: 2022-06-16

## 2022-06-16 PROCEDURE — 99212 OFFICE O/P EST SF 10 MIN: CPT | Performed by: OBSTETRICS & GYNECOLOGY

## 2022-06-16 NOTE — PROGRESS NOTES
"  Chief complaint-perineal pain    History of present illness- Patient is a 32 y.o.  who I saw last week due to perineal pain and ecchymosis.  Patient is feeling much better.  She is doing the sitz bath's, the lidocaine and the hydrocortisone foam.  She has no complaints today.  Her blood pressure was elevated at that visit possibly due to pain.  The pharmacy did not fill the labetalol and her blood pressure is normal today.          /70   Ht 167.6 cm (66\")   Wt 71.2 kg (157 lb)   LMP  (LMP Unknown)   Breastfeeding Yes   BMI 25.34 kg/m²   Physical Exam  Constitutional:       General: She is not in acute distress.     Appearance: Normal appearance. She is not ill-appearing.   Neurological:      Mental Status: She is alert.   Psychiatric:         Mood and Affect: Mood normal.         Thought Content: Thought content normal.         Judgment: Judgment normal.         Urine culture was negative.  Diagnoses and all orders for this visit:    1. Perineal pain (Primary)    Patient is feeling much better.  She does not feel like she needs an exam today.  Blood pressure is normal.  Likely her elevation was probably due to pain.  She will follow-up for postpartum visit in 4 to 5 weeks.  We discussed options for contraception.  She will have pelvic rest for 6 weeks after her delivery.  "

## 2022-07-14 ENCOUNTER — POSTPARTUM VISIT (OUTPATIENT)
Dept: OBSTETRICS AND GYNECOLOGY | Age: 32
End: 2022-07-14

## 2022-07-14 VITALS
HEIGHT: 66 IN | WEIGHT: 151 LBS | DIASTOLIC BLOOD PRESSURE: 66 MMHG | SYSTOLIC BLOOD PRESSURE: 108 MMHG | BODY MASS INDEX: 24.27 KG/M2

## 2022-07-14 DIAGNOSIS — R68.89 COLD INTOLERANCE: ICD-10-CM

## 2022-07-14 DIAGNOSIS — Z12.4 SCREENING FOR MALIGNANT NEOPLASM OF THE CERVIX: ICD-10-CM

## 2022-07-14 DIAGNOSIS — Z11.51 SPECIAL SCREENING EXAMINATION FOR HUMAN PAPILLOMAVIRUS (HPV): ICD-10-CM

## 2022-07-14 PROCEDURE — 0503F POSTPARTUM CARE VISIT: CPT | Performed by: OBSTETRICS & GYNECOLOGY

## 2022-07-14 RX ORDER — ACETAMINOPHEN AND CODEINE PHOSPHATE 120; 12 MG/5ML; MG/5ML
1 SOLUTION ORAL DAILY
Qty: 90 TABLET | Refills: 3 | Status: SHIPPED | OUTPATIENT
Start: 2022-07-14 | End: 2023-07-14

## 2022-07-14 NOTE — PROGRESS NOTES
"  Subjective   Mo Castro is a 32 y.o. female who presents for a postpartum visit. She is 6 weeks postpartum following a spontaneous vaginal delivery. I have fully reviewed the prenatal and intrapartum course. Postpartum course has been uneventful. Baby is feeding by breast. Bleeding has been normal in amount and decreasing. Bowel function is normal. Bladder function is normal. Patient not sexually active at this time. Contraception method is discussed. Postpartum depression screening: negative.    The following portions of the patient's history were reviewed and updated as appropriate: allergies, current medications,and problem list.    Review of Systems  Pertinent items are noted in HPI.    Objective   /66   Ht 167.6 cm (66\")   Wt 68.5 kg (151 lb)   LMP  (LMP Unknown)   Breastfeeding Yes   BMI 24.37 kg/m²    General:  Alert and oriented, NAD    Breasts:         Heart:     Abdomen: Normal findings, nontender    Vulva: Normal, well-healed    Vagina: No lesions or abnormal discharge   Cervix:  Normal with no cervical motion tenderness   Corpus: Normal for post partum visit   Adnexa:  Non tender, non enlarged         Assessment & Plan     Normal postpartum exam. Pap smear done at today's visit.    1. Contraception: POP   2. Slow return to normal activities reviewed. Continue prenatal vitamins.  3. Follow up in 12 months or sooner as needed.           "

## 2022-07-15 LAB — TSH SERPL DL<=0.005 MIU/L-ACNC: 0.54 UIU/ML (ref 0.45–4.5)

## 2022-07-18 ENCOUNTER — TELEPHONE (OUTPATIENT)
Dept: OBSTETRICS AND GYNECOLOGY | Age: 32
End: 2022-07-18

## 2022-07-18 LAB
CYTOLOGIST CVX/VAG CYTO: NORMAL
CYTOLOGY CVX/VAG DOC CYTO: NORMAL
CYTOLOGY CVX/VAG DOC THIN PREP: NORMAL
DX ICD CODE: NORMAL
HIV 1 & 2 AB SER-IMP: NORMAL
HPV I/H RISK 4 DNA CVX QL PROBE+SIG AMP: NEGATIVE
OTHER STN SPEC: NORMAL
STAT OF ADQ CVX/VAG CYTO-IMP: NORMAL

## 2022-07-18 RX ORDER — FLUCONAZOLE 150 MG/1
150 TABLET ORAL DAILY
Qty: 2 TABLET | Refills: 0 | Status: SHIPPED | OUTPATIENT
Start: 2022-07-18 | End: 2022-10-11

## 2022-07-18 NOTE — TELEPHONE ENCOUNTER
PT calls stating her baby developed thrush over the weekend and pt believes she developed thrush herself. Pt c/o very pink nipples, burning after breast feeding. Pt asking for medication to be called in, please advise, [facility] pharmacy verified

## 2022-07-20 ENCOUNTER — HOSPITAL ENCOUNTER (OUTPATIENT)
Dept: LACTATION | Facility: HOSPITAL | Age: 32
Discharge: HOME | End: 2022-07-20

## 2022-07-20 NOTE — LACTATION NOTE
Mom reports baby not latching as good as she was and isnt BF as long. Mom pumps some and dad bottle feeds baby but mom reports baby takes one hour to eat. She pumps 5-6 oz with each pumping. Baby BF well today on right breast for 6 min and transferred 1.7 oz. Assisted with bottle feeding mom's milk with debbie toppy bottle. Baby breaks suction a lot. Mom reports baby has been gassy. LC applied cheek support and baby maintains better latch and is not breaking suction. Baby may have some upper lip restriction also. Tried to call speech therapy for possible consult. Gave mom info to contact speech therapy. Mom reports she and baby are being treated for thrush. Thrush not visible on mom or baby at this time so that seems to be resolving. Baby has f/u with ped int the next week. Mom will talk to ped about her concerns and f/u with [facility] if needed    Today's weight 10-4.8    Lactation Consult Note    Evaluation Completed: 2022 10:37 EDT  Patient Name: Mo Castro  :  1990  MRN:  [id number]     REFERRAL  INFORMATION:                          Date of Referral: 22                MATERNAL ASSESSMENT:     Breast Shape: round (22)  Breast Density: full (22)  Areola: elastic (22)  Nipples: everted (22)                MATERNAL INFANT FEEDING:        Infant Positioning: cross-cradle (22)      Pain with Feeding: no (22)                       Latch Assistance: minimal assistance (22)                                    Effective Latch During Feeding: yes (22)        Prefeeding Weight (gm): 4672 g (164.8 oz) (22)  Postfeeding Weight (gm): 4720 g (166.5 oz) (22)  Weight Gain/Loss (gm) : 48 g (1.7 oz) (22)      Latch: 2-->grasps breast, tongue down, lips flanged, rhythmic sucking (22)  Audible Swallowin-->spontaneous and intermittent (24 hrs old) (22)  Type of Nipple:  2-->everted (after stimulation) (07/20/22 0900)  Comfort (Breast/Nipple): 2-->soft/nontender (07/20/22 0900)  Hold (Positioning): 1-->minimal assist, teach one side, mother does other, staff holds (07/20/22 0900)  Latch Score: 9 (07/20/22 0900)      EQUIPMENT TYPE:                                 BREAST PUMPING:          LACTATION REFERRALS:

## 2022-08-18 ENCOUNTER — TELEPHONE (OUTPATIENT)
Dept: OBSTETRICS AND GYNECOLOGY | Age: 32
End: 2022-08-18

## 2022-08-18 NOTE — TELEPHONE ENCOUNTER
Caller: NYLA Salas call back number: [phone] OK TO [facility] PLEASE CALL WHEN DONE     What form or medical record are you requesting: A WORK RELEASE NOTE SAYING PT CAN GO BACK TO WORK 8/30/22    PLEASE PUT ON HER MY CHART SO PT CAN ACCESS

## 2024-06-10 ENCOUNTER — OFFICE VISIT (OUTPATIENT)
Dept: OBSTETRICS AND GYNECOLOGY | Age: 34
End: 2024-06-10
Payer: COMMERCIAL

## 2024-06-10 VITALS
SYSTOLIC BLOOD PRESSURE: 108 MMHG | WEIGHT: 152 LBS | DIASTOLIC BLOOD PRESSURE: 74 MMHG | BODY MASS INDEX: 24.43 KG/M2 | HEIGHT: 66 IN

## 2024-06-10 DIAGNOSIS — O20.0 THREATENED ABORTION: Primary | ICD-10-CM

## 2024-06-10 PROCEDURE — 99213 OFFICE O/P EST LOW 20 MIN: CPT | Performed by: OBSTETRICS & GYNECOLOGY

## 2024-06-10 RX ORDER — CETIRIZINE HYDROCHLORIDE 10 MG/1
10 TABLET ORAL DAILY
COMMUNITY

## 2024-06-10 NOTE — PROGRESS NOTES
"Subjective      Mo Castro is a 34 y.o. female. Mo reports bleeding since yest. She is not in acute distress. Ectopic risks: none.    Cycle length: regular q 28   LMP 24.  Pregnancy testing: at home.  Pregnancy imaging:  today  .  Blood type: O positive.  Other lab results: none.        Review of Systems  Pertinent items are noted in HPI.     Objective      /74   Ht 167.6 cm (66\")   Wt 68.9 kg (152 lb)   LMP 2024 (Approximate)   BMI 24.53 kg/m²     General: alert, appears stated age, and cooperative   Heart:    Lungs:    Abdomen:    Vulva:    Vagina:    Cervix:    Uterus:    Adnexa:      Imaging  Limited office ultrasound: Intrauterine pregnancy measuring about 6 weeks with no cardiac activity.  Yolk sac is seen.  Adnexa appear normal.      Assessment/Plan     Diagnoses and all orders for this visit:    1. Threatened  (Primary)        Discussed potential for nonviable pregnancy.  Recommend repeat ultrasound in 1 week.  Offered to check progesterone and beta hCGs.  Patient would like to wait and just to ultrasound.  Bleeding precautions were given.  "

## 2024-06-12 ENCOUNTER — OFFICE VISIT (OUTPATIENT)
Dept: OBSTETRICS AND GYNECOLOGY | Age: 34
End: 2024-06-12
Payer: COMMERCIAL

## 2024-06-12 ENCOUNTER — TELEPHONE (OUTPATIENT)
Dept: OBSTETRICS AND GYNECOLOGY | Age: 34
End: 2024-06-12
Payer: COMMERCIAL

## 2024-06-12 VITALS
DIASTOLIC BLOOD PRESSURE: 72 MMHG | BODY MASS INDEX: 23.63 KG/M2 | SYSTOLIC BLOOD PRESSURE: 110 MMHG | HEIGHT: 66 IN | WEIGHT: 147 LBS

## 2024-06-12 DIAGNOSIS — O20.0 THREATENED MISCARRIAGE: Primary | ICD-10-CM

## 2024-06-12 PROCEDURE — 99213 OFFICE O/P EST LOW 20 MIN: CPT | Performed by: NURSE PRACTITIONER

## 2024-06-12 NOTE — TELEPHONE ENCOUNTER
Pt seen in office on 6/10/24 for viability US & no cardiac activity seen. Pt calling today stating she is now bleeding & passing tissue. Pt spoke with on call provider last night & was advised to contact the office this morning to be seen. Pt added on for US & gyn f/u with NP.

## 2024-06-12 NOTE — PROGRESS NOTES
"Subjective   Mo Castro is a 34 y.o. female is being seen today for   Chief Complaint   Patient presents with    Gynecologic Exam     Possible miscarriage, US today   .    History of Present Illness    Patient of Dr Orta  Here two days ago for ultrasound for viability- at that time there was an IUP noted, fetal pole measuring 6w2d and no heart beat  Had some bleeding yesterday and passed some tissue  Bleeding stopped today- no cramping  Blood type O+  She is scheduled to RTO on Monday for repeat ultrasound    The following portions of the patient's history were reviewed and updated as appropriate: allergies, current medications, past family history, past medical history, past social history, past surgical history and problem list.    /72   Ht 167.6 cm (66\")   Wt 66.7 kg (147 lb)   LMP 04/03/2024 (Approximate)   BMI 23.73 kg/m²         Review of Systems   Constitutional: Negative.    HENT: Negative.     Eyes: Negative.    Respiratory: Negative.     Cardiovascular: Negative.    Gastrointestinal: Negative.    Endocrine: Negative.    Genitourinary:  Positive for vaginal bleeding.   Musculoskeletal: Negative.    Skin: Negative.    Allergic/Immunologic: Negative.    Neurological: Negative.    Hematological: Negative.    Psychiatric/Behavioral: Negative.         Objective   Physical Exam  Constitutional:       Appearance: She is well-developed.   Cardiovascular:      Rate and Rhythm: Normal rate and regular rhythm.   Pulmonary:      Effort: Pulmonary effort is normal.   Abdominal:      General: Bowel sounds are normal. There is no distension.      Palpations: Abdomen is soft.      Tenderness: There is no abdominal tenderness.   Skin:     General: Skin is warm and dry.   Neurological:      Mental Status: She is alert and oriented to person, place, and time.   Psychiatric:         Behavior: Behavior normal.           Assessment & Plan   Diagnoses and all orders for this visit:    1. Threatened miscarriage " (Primary)      Ultrasound done, IUP noted with fetal pole measuring 6w2d, no cardiac activity  Discussed with patient, ultrasound appears the same as previous  Given her bleeding and lack of changes in ultrasound it is likely a miscarriage but would plan to recheck on Monday to be sure  Bleeding precautions advised         Total time spent today with Mo  was 20 minutes (level 3).  Greater than 50% of the time was spent coordinating care, answering her questions and counseling regarding pathophysiology of her presenting problem along with plans for any diagnositc work-up and treatment.

## 2024-06-16 ENCOUNTER — HOSPITAL ENCOUNTER (EMERGENCY)
Facility: HOSPITAL | Age: 34
Discharge: HOME | End: 2024-06-16
Attending: EMERGENCY MEDICINE | Admitting: EMERGENCY MEDICINE
Payer: COMMERCIAL

## 2024-06-16 ENCOUNTER — APPOINTMENT (OUTPATIENT)
Dept: ULTRASOUND IMAGING | Facility: HOSPITAL | Age: 34
End: 2024-06-16
Payer: COMMERCIAL

## 2024-06-16 VITALS
TEMPERATURE: 98.7 F | DIASTOLIC BLOOD PRESSURE: 70 MMHG | HEART RATE: 65 BPM | RESPIRATION RATE: 16 BRPM | SYSTOLIC BLOOD PRESSURE: 104 MMHG | HEIGHT: 66 IN | OXYGEN SATURATION: 100 % | WEIGHT: 147 LBS | BODY MASS INDEX: 23.63 KG/M2

## 2024-06-16 DIAGNOSIS — O03.4 INCOMPLETE MISCARRIAGE: Primary | ICD-10-CM

## 2024-06-16 LAB
ABO GROUP BLD: NORMAL
ALBUMIN SERPL-MCNC: 4.3 G/DL (ref 3.5–5.2)
ALBUMIN/GLOB SERPL: 1.8 G/DL
ALP SERPL-CCNC: 47 U/L (ref 39–117)
ALT SERPL W P-5'-P-CCNC: 9 U/L (ref 1–33)
ANION GAP SERPL CALCULATED.3IONS-SCNC: 11 MMOL/L (ref 5–15)
AST SERPL-CCNC: 12 U/L (ref 1–32)
BASOPHILS # BLD AUTO: 0.03 10*3/MM3 (ref 0–0.2)
BASOPHILS NFR BLD AUTO: 0.3 % (ref 0–1.5)
BILIRUB SERPL-MCNC: <0.2 MG/DL (ref 0–1.2)
BLD GP AB SCN SERPL QL: NEGATIVE
BUN SERPL-MCNC: 7 MG/DL (ref 6–20)
BUN/CREAT SERPL: 11.7 (ref 7–25)
CALCIUM SPEC-SCNC: 8.9 MG/DL (ref 8.6–10.5)
CHLORIDE SERPL-SCNC: 106 MMOL/L (ref 98–107)
CO2 SERPL-SCNC: 22 MMOL/L (ref 22–29)
CREAT SERPL-MCNC: 0.6 MG/DL (ref 0.57–1)
DEPRECATED RDW RBC AUTO: 40.9 FL (ref 37–54)
EGFRCR SERPLBLD CKD-EPI 2021: 122.5 ML/MIN/1.73
EOSINOPHIL # BLD AUTO: 0.03 10*3/MM3 (ref 0–0.4)
EOSINOPHIL NFR BLD AUTO: 0.3 % (ref 0.3–6.2)
ERYTHROCYTE [DISTWIDTH] IN BLOOD BY AUTOMATED COUNT: 12 % (ref 12.3–15.4)
GLOBULIN UR ELPH-MCNC: 2.4 GM/DL
GLUCOSE SERPL-MCNC: 142 MG/DL (ref 65–99)
HCG INTACT+B SERPL-ACNC: 2004 MIU/ML
HCT VFR BLD AUTO: 39.7 % (ref 34–46.6)
HGB BLD-MCNC: 13.4 G/DL (ref 12–15.9)
HOLD SPECIMEN: NORMAL
HOLD SPECIMEN: NORMAL
IMM GRANULOCYTES # BLD AUTO: 0.02 10*3/MM3 (ref 0–0.05)
IMM GRANULOCYTES NFR BLD AUTO: 0.2 % (ref 0–0.5)
LYMPHOCYTES # BLD AUTO: 1.56 10*3/MM3 (ref 0.7–3.1)
LYMPHOCYTES NFR BLD AUTO: 13.8 % (ref 19.6–45.3)
MCH RBC QN AUTO: 31.9 PG (ref 26.6–33)
MCHC RBC AUTO-ENTMCNC: 33.8 G/DL (ref 31.5–35.7)
MCV RBC AUTO: 94.5 FL (ref 79–97)
MONOCYTES # BLD AUTO: 0.41 10*3/MM3 (ref 0.1–0.9)
MONOCYTES NFR BLD AUTO: 3.6 % (ref 5–12)
NEUTROPHILS NFR BLD AUTO: 81.8 % (ref 42.7–76)
NEUTROPHILS NFR BLD AUTO: 9.24 10*3/MM3 (ref 1.7–7)
NRBC BLD AUTO-RTO: 0 /100 WBC (ref 0–0.2)
PLATELET # BLD AUTO: 232 10*3/MM3 (ref 140–450)
PMV BLD AUTO: 9.3 FL (ref 6–12)
POTASSIUM SERPL-SCNC: 4.1 MMOL/L (ref 3.5–5.2)
PROT SERPL-MCNC: 6.7 G/DL (ref 6–8.5)
RBC # BLD AUTO: 4.2 10*6/MM3 (ref 3.77–5.28)
RH BLD: POSITIVE
SODIUM SERPL-SCNC: 139 MMOL/L (ref 136–145)
T&S EXPIRATION DATE: NORMAL
WBC NRBC COR # BLD AUTO: 11.29 10*3/MM3 (ref 3.4–10.8)
WHOLE BLOOD HOLD COAG: NORMAL
WHOLE BLOOD HOLD SPECIMEN: NORMAL

## 2024-06-16 PROCEDURE — 85025 COMPLETE CBC W/AUTO DIFF WBC: CPT

## 2024-06-16 PROCEDURE — 86850 RBC ANTIBODY SCREEN: CPT

## 2024-06-16 PROCEDURE — 80053 COMPREHEN METABOLIC PANEL: CPT

## 2024-06-16 PROCEDURE — 99284 EMERGENCY DEPT VISIT MOD MDM: CPT

## 2024-06-16 PROCEDURE — 84702 CHORIONIC GONADOTROPIN TEST: CPT

## 2024-06-16 PROCEDURE — 86901 BLOOD TYPING SEROLOGIC RH(D): CPT

## 2024-06-16 PROCEDURE — 76815 OB US LIMITED FETUS(S): CPT

## 2024-06-16 PROCEDURE — 25810000003 SODIUM CHLORIDE 0.9 % SOLUTION: Performed by: PHYSICIAN ASSISTANT

## 2024-06-16 PROCEDURE — 76817 TRANSVAGINAL US OBSTETRIC: CPT

## 2024-06-16 PROCEDURE — 86900 BLOOD TYPING SEROLOGIC ABO: CPT

## 2024-06-16 PROCEDURE — 36415 COLL VENOUS BLD VENIPUNCTURE: CPT

## 2024-06-16 RX ORDER — MISOPROSTOL 200 UG/1
800 TABLET ORAL ONCE
Status: COMPLETED | OUTPATIENT
Start: 2024-06-16 | End: 2024-06-16

## 2024-06-16 RX ORDER — MISOPROSTOL 200 UG/1
TABLET ORAL
Qty: 4 TABLET | Refills: 0 | Status: SHIPPED | OUTPATIENT
Start: 2024-06-16 | End: 2024-06-17

## 2024-06-16 RX ADMIN — MISOPROSTOL 800 MCG: 200 TABLET ORAL at 19:17

## 2024-06-16 RX ADMIN — SODIUM CHLORIDE 1000 ML: 9 INJECTION, SOLUTION INTRAVENOUS at 18:54

## 2024-06-16 NOTE — ED PROVIDER NOTES
EMERGENCY DEPARTMENT MD ATTESTATION NOTE    Room Number:    PCP: Maren Galvin PA  Independent Historians: Patient    HPI:  A complete HPI/ROS/PMH/PSH/SH/FH are unobtainable due to: None    Chronic or social conditions impacting patient care (Social Determinants of Health): None      Context: Mo Castro is a 34 y.o. female with a medical history of migraine headaches who presents to the ED c/o acute vaginal bleeding and pelvic cramping since yesterday.  Patient is .  Had recently seen her OB provider last week.  Had an ultrasound showing IUP but no cardiac activity and clinical picture was worrisome for probable miscarriage.  Patient began bleeding rather heavily yesterday and has continued with heavy bleeding today and some pelvic cramping.  No fainting.  No fevers.      Review of prior external notes (non-ED) -and- Review of prior external test results outside of this encounter: I independently reviewed the OB/GYN progress note from 2024.  Ultrasound procedure at that visit showed IUP at 6 weeks and 2 days but no cardiac activity.  Ultrasound findings were same as previous ultrasound reading from earlier encounter.        PHYSICAL EXAM    I have reviewed the triage vital signs and nursing notes.    ED Triage Vitals   Temp Heart Rate Resp BP SpO2   24 1408 24 1408 24 1408 24 1428 24 1408   98.7 °F (37.1 °C) 85 16 109/85 99 %      Temp src Heart Rate Source Patient Position BP Location FiO2 (%)   24 1408 24 1408 -- -- --   Tympanic Monitor          Physical Exam  GENERAL: alert, no acute distress  SKIN: Warm, dry, no rashes  HENT: Normocephalic, atraumatic  EYES: no scleral icterus  CV: regular rhythm, regular rate  RESPIRATORY: normal effort, lungs clear  ABDOMEN: soft, nontender, nondistended  MUSCULOSKELETAL: no deformity  NEURO: alert, moves all extremities, follows commands            MEDICATIONS GIVEN IN ER  Medications   sodium chloride 0.9 %  bolus 1,000 mL (0 mL Intravenous Stopped 6/16/24 2000)   miSOPROStol (CYTOTEC) tablet 800 mcg (800 mcg Vaginal Given 6/16/24 1917)         ORDERS PLACED DURING THIS VISIT:  Orders Placed This Encounter   Procedures    US Ob Limited 1 + Fetuses    US Ob Transvaginal    hCG, Quantitative, Pregnancy    [place] Draw    Comprehensive Metabolic Panel    CBC Auto Differential    Telemetry Scan    Telemetry Scan    Type & Screen    CBC & Differential    Green Top (Gel)    Lavender Top    Gold Top - SST    Light Blue Top         PROCEDURES  Procedures            PROGRESS, DATA ANALYSIS, CONSULTS, AND MEDICAL DECISION MAKING  All labs have been independently interpreted by me.  All radiology studies have been reviewed by me. All EKG's have been independently viewed and interpreted by me.  Discussion below represents my analysis of pertinent findings related to patient's condition, differential diagnosis, treatment plan and final disposition.    Differential diagnosis includes but is not limited to threatened miscarriage, incomplete miscarriage, ectopic pregnancy, PID, UTI.    Clinical Scores:                   ED Course as of 06/16/24 2219   Sun Jun 16, 2024   1504 WBC(!): 11.29 [DC]   1505 Hemoglobin: 13.4 [DC]   1506 6/5/22 labs show O+ blood type [DC]   1800 HCG Quantitative: 2,004.00 [BRITTANY]   1855 Discussed case with Dr. Camacho, OB/GYN.  States with patient's reassuring heart rate and labs we may administer misoprostol here in the ER and have patient follow-up tomorrow as scheduled in office.  If patient is uncomfortable with this plan we also could admit her to her service for continued monitoring. [DC]   1906 Discussed lab and imaging results with patient.  She would prefer to have misoprostol here in the ER and discharged home with follow-up tomorrow.  We discussed return precautions. [DC]      ED Course User Index  [DC] Sabine Temple PA  [BRITTANY] Polo Oleary MD       MDM:   I have reviewed the labs from today's ED  "visit.  Her hemoglobin and hematocrit are normal range and stable.  She is normotensive with her blood pressure.  The metabolic panel is nonworrisome.  Quantitative hCG is 2000.  The ultrasound study today indicates that the previously seen gestational sac and embryo are no longer visualized in the uterus.    We discussed with patient's GYN provider over the telephone.  Recommendation was to initiate vaginal misoprostol therapy and have patient follow-up in their office tomorrow at 3:00 PM.  Patient is agreeable with this plan.  Discussed with her the usual \"return to ER\" instructions prior to discharge.      COMPLEXITY OF CARE  Admission was considered but after careful review of the patient's presentation, physical examination, diagnostic results, and response to treatment the patient may be safely discharged with outpatient follow-up.    Please note that portions of this document were completed with a voice recognition program.    Note Disclaimer: At [facility], we believe that sharing information builds trust and better relationships. You are receiving this note because you recently visited [facility]. It is possible you will see health information before a provider has talked with you about it. This kind of information can be easy to misunderstand. To help you fully understand what it means for your health, we urge you to discuss this note with your provider.         Polo Oleary MD  06/16/24 4380    "

## 2024-06-16 NOTE — ED PROVIDER NOTES
EMERGENCY DEPARTMENT ENCOUNTER      PCP: Maren Galvin PA  Patient Care Team:  Maern Galvin PA as PCP - General (Physician Assistant)  Martin Orta MD as Gynecologist (Obstetrics and Gynecology)   Independent Historians: Patient    HPI:  Chief Complaint: Vaginal bleeding, pregnant  A complete HPI/ROS/PMH/PSH/SH/FH are unobtainable due to: None    Chronic or social conditions impacting patient care (social determinants of health): None    Context: Mo Castro is a G2, P1 34 y.o. female who presents to the ED c/o acute vaginal bleeding in pregnancy.  Patient states she was recently diagnosed with threatened miscarriage.  Ultrasound performed on 2024 showed IUP with fetal pole measuring 6 weeks 2 days no cardiac activity.  She began having bleeding but it increased today with large clots and associated pelvic cramping.  She reports feeling lightheaded.  No fevers or chills.  She states due to bleeding significantly worsening her OB office recommended she come in for evaluation.  She was sent in misoprostol to the pharmacy today but had not picked up.  She is scheduled for follow-up in office tomorrow with her primary OB Dr. Orta.    Review of prior external notes and/or external test results outside of this encounter: Reviewed office encounter with OB/GYN JUN Hoskins on 24.  She was seen in follow-up for threatened miscarriage.  Ultrasound on 2024 showed IUP, fetal pole 6 weeks 2 days, no cardiac activity.      PAST MEDICAL HISTORY  Active Ambulatory Problems     Diagnosis Date Noted    Migraine 2018     Resolved Ambulatory Problems     Diagnosis Date Noted    Supervision of normal first pregnancy, antepartum 11/15/2021    Pregnancy 2022     (normal spontaneous vaginal delivery) 2022     Past Medical History:   Diagnosis Date    Headache        The patient has started, but not completed, their COVID-19 vaccination series.    PAST SURGICAL HISTORY  Past Surgical  History:   Procedure Laterality Date    TONSILLECTOMY           FAMILY HISTORY  Family History   Problem Relation Age of Onset    Hyperlipidemia Father     Breast cancer Maternal Grandmother 68    Ovarian cancer Maternal Grandmother 70    Lymphoma Paternal Grandmother     Hyperlipidemia Paternal Grandmother     Other Mother         BRCA negative - full panel neg          SOCIAL HISTORY  Social History     Socioeconomic History    Marital status:      Spouse name: Brian   Tobacco Use    Smoking status: Never    Smokeless tobacco: Never   Vaping Use    Vaping status: Never Used   Substance and Sexual Activity    Alcohol use: Not Currently     Alcohol/week: 2.0 standard drinks of alcohol     Types: 2 Glasses of wine per week    Drug use: No    Sexual activity: Yes     Partners: Male     Birth control/protection: None         ALLERGIES  Amoxicillin and Grass        REVIEW OF SYSTEMS  Review of Systems   Respiratory:  Negative for shortness of breath.    Genitourinary:  Positive for pelvic pain and vaginal bleeding.   Neurological:  Positive for light-headedness.        All systems reviewed and negative except for those discussed in HPI.       PHYSICAL EXAM    I have reviewed the triage vital signs and nursing notes.    ED Triage Vitals   Temp Heart Rate Resp BP SpO2   06/16/24 1408 06/16/24 1408 06/16/24 1408 06/16/24 1428 06/16/24 1408   98.7 °F (37.1 °C) 85 16 109/85 99 %      Temp src Heart Rate Source Patient Position BP Location FiO2 (%)   06/16/24 1408 06/16/24 1408 -- -- --   Tympanic Monitor          Physical Exam  GENERAL: alert, no acute distress  SKIN: Warm, dry  HENT: Normocephalic, atraumatic  EYES: no scleral icterus  CV: regular rhythm, regular rate  RESPIRATORY: normal effort, lungs clear  ABDOMEN: soft, mild suprapubic discomfort to palpation, nondistended  PELVIC: Performed with RN chaperone, there is a moderate amount of blood in vaginal vault with small clots, bleeding does not appear to be  severe at this time  MUSCULOSKELETAL: no deformity  NEURO: alert, moves all extremities, follows commands          LAB RESULTS  Recent Results (from the past 24 hour(s))   Green Top (Gel)    Collection Time: 06/16/24  2:36 PM   Result Value Ref Range    Extra Tube Hold for add-ons.    Lavender Top    Collection Time: 06/16/24  2:36 PM   Result Value Ref Range    Extra Tube hold for add-on    Gold Top - SST    Collection Time: 06/16/24  2:36 PM   Result Value Ref Range    Extra Tube Hold for add-ons.    Light Blue Top    Collection Time: 06/16/24  2:36 PM   Result Value Ref Range    Extra Tube Hold for add-ons.    CBC Auto Differential    Collection Time: 06/16/24  2:36 PM    Specimen: Blood   Result Value Ref Range    WBC 11.29 (H) 3.40 - 10.80 10*3/mm3    RBC 4.20 3.77 - 5.28 10*6/mm3    Hemoglobin 13.4 12.0 - 15.9 g/dL    Hematocrit 39.7 34.0 - 46.6 %    MCV 94.5 79.0 - 97.0 fL    MCH 31.9 26.6 - 33.0 pg    MCHC 33.8 31.5 - 35.7 g/dL    RDW 12.0 (L) 12.3 - 15.4 %    RDW-SD 40.9 37.0 - 54.0 fl    MPV 9.3 6.0 - 12.0 fL    Platelets 232 140 - 450 10*3/mm3    Neutrophil % 81.8 (H) 42.7 - 76.0 %    Lymphocyte % 13.8 (L) 19.6 - 45.3 %    Monocyte % 3.6 (L) 5.0 - 12.0 %    Eosinophil % 0.3 0.3 - 6.2 %    Basophil % 0.3 0.0 - 1.5 %    Immature Grans % 0.2 0.0 - 0.5 %    Neutrophils, Absolute 9.24 (H) 1.70 - 7.00 10*3/mm3    Lymphocytes, Absolute 1.56 0.70 - 3.10 10*3/mm3    Monocytes, Absolute 0.41 0.10 - 0.90 10*3/mm3    Eosinophils, Absolute 0.03 0.00 - 0.40 10*3/mm3    Basophils, Absolute 0.03 0.00 - 0.20 10*3/mm3    Immature Grans, Absolute 0.02 0.00 - 0.05 10*3/mm3    nRBC 0.0 0.0 - 0.2 /100 WBC       Ordered the above labs and independently reviewed and interpreted the results.        RADIOLOGY  US Ob Limited 1 + Fetuses   Final Result       The endometrium and cervix is distended with heterogenous echogenic   material likely representing hemorrhage in keeping with patient history   of ongoing miscarriage. The  previously seen gestational sac and embryo   on 6/12/2024 are no longer visualized. Color Doppler flow is   demonstrated inconsistently overlying the heterogenous endometrium.   Findings can be seen in setting of retained products of conception in   the the appropriate clinical context and correlation with patient   history as well as continued attention on follow-up is recommended.           This report was finalized on 6/16/2024 6:30 PM by Dr. Pedro Brito M.D   on Workstation: [id number]          US Ob Transvaginal    (Results Pending)       I ordered the above noted radiological studies. Independently reviewed and interpreted by me.  See dictation for official radiology interpretation.      PROCEDURES    Procedures      MEDICATIONS GIVEN IN ER    Medications   sodium chloride 0.9 % bolus 1,000 mL (0 mL Intravenous Stopped 6/16/24 2000)   miSOPROStol (CYTOTEC) tablet 800 mcg (800 mcg Vaginal Given 6/16/24 1917)         PROGRESS, DATA ANALYSIS, CONSULTS, AND MEDICAL DECISION MAKING    All labs have been independently reviewed and interpreted by me.  All radiology studies have been independently reviewed and interpreted by me and discussed with radiologist dictating the report.   EKG's independently reviewed and interpreted by me.  Discussion below represents my analysis of pertinent findings related to patient's condition, differential diagnosis, treatment plan and final disposition.      My differential diagnosis for vaginal bleeding in a pregnant patient includes but is not limited to:  Bleeding in the first trimester   miscarriage   threatened miscarriage: with or without identifiable subchorionic hemorrhage   missed miscarriage   incomplete miscarriage   subchorionic hemorrhage   retained products of conception   ectopic pregnancy   gestational trophoblastic disease   demise of a twin   implantation bleeding              ED Course as of 06/18/24 1721   Sun Jun 16, 2024   1504 WBC(!): 11.29 [DC]   1502  Hemoglobin: 13.4 [DC]   1506 6/5/22 labs show O+ blood type [DC]   1800 HCG Quantitative: 2,004.00 [BRITTANY]   1855 Discussed case with Dr. Camacho, OB/GYN.  States with patient's reassuring heart rate and labs we may administer misoprostol here in the ER and have patient follow-up tomorrow as scheduled in office.  If patient is uncomfortable with this plan we also could admit her to her service for continued monitoring. [DC]   1906 Discussed lab and imaging results with patient.  She would prefer to have misoprostol here in the ER and discharged home with follow-up tomorrow.  We discussed return precautions. [DC]      ED Course User Index  [DC] Sabine Temple PA  [BRITTANY] Polo Oleary MD             AS OF 17:21 EDT VITALS:    BP - 104/70  HR - 65  TEMP - 98.7 °F (37.1 °C) (Tympanic)  O2 SATS - 100%        DIAGNOSIS  Final diagnoses:   Incomplete miscarriage         DISPOSITION  ED Disposition       ED Disposition   Discharge    Condition   Stable    Comment   --                  Note Disclaimer: At [facility], we believe that sharing information builds trust and better relationships. You are receiving this note because you recently visited [facility]. It is possible you will see health information before a provider has talked with you about it. This kind of information can be easy to misunderstand. To help you fully understand what it means for your health, we urge you to discuss this note with your provider.         Sabine Temple PA  06/18/24 2516

## 2024-06-16 NOTE — PROGRESS NOTES
Patient called to report passage miscarriage.  She had sudden onset of bleeding and cramping yesterday and then it stopped.  It restarted a few hours ago.  She feels that the bleeding is quite heavy.  Reports soaking through a pad in 2 hours.  Noted minor dizziness that resolved.  Not much pain.  Discussed that this is a reasonable delaney to passage of a miscarriage but if she develops any dizziness, lightheadedness, nausea, or bleeding increases at all, I would recommend that she come to the ER.  Discussed option of taking misoprostol to expedite the bleeding.  She would like to do this so prescription sent to pharmacy.  Discussed inserting all 4 tablets at once and expecting increased cramping and bleeding.  Recommend ibuprofen 800 mg every 8 for pain control.  She has appointment with ultrasound scheduled tomorrow.  Discussed making sure she comes to that appointment.

## 2024-06-16 NOTE — ED NOTES
Pt is 9 weeks pregnant and started bleeding yest aft.  She passed a baseball size cloth today.  She is light headed and weak

## 2024-06-17 ENCOUNTER — OFFICE VISIT (OUTPATIENT)
Dept: OBSTETRICS AND GYNECOLOGY | Age: 34
End: 2024-06-17
Payer: COMMERCIAL

## 2024-06-17 VITALS
HEIGHT: 66 IN | BODY MASS INDEX: 23.63 KG/M2 | DIASTOLIC BLOOD PRESSURE: 66 MMHG | WEIGHT: 147 LBS | SYSTOLIC BLOOD PRESSURE: 108 MMHG

## 2024-06-17 DIAGNOSIS — O03.9 COMPLETE ABORTION: Primary | ICD-10-CM

## 2024-06-17 PROCEDURE — 99213 OFFICE O/P EST LOW 20 MIN: CPT | Performed by: OBSTETRICS & GYNECOLOGY

## 2024-06-18 NOTE — PROGRESS NOTES
"  Chief complaint-recent miscarriage    History of present illness- Patient is a 34 y.o.  who is here for follow-up.  Patient went to the emergency room and had a miscarriage.  She had some heavy bleeding.  She passed tissue in the restroom at the emergency room.  Her bleeding has now stopped.  She is here for follow-up ultrasound.  Previous ultrasound did show gestational sac with fetal pole with no heartbeat.        /66   Ht 167.6 cm (66\")   Wt 66.7 kg (147 lb)   LMP 2024 (Approximate)   BMI 23.73 kg/m²   Physical Exam  Constitutional:       Appearance: Normal appearance.   Pulmonary:      Effort: Pulmonary effort is normal.   Neurological:      Mental Status: She is alert.   Psychiatric:         Mood and Affect: Mood normal.         Thought Content: Thought content normal.         Judgment: Judgment normal.     Pelvic ultrasound shows resolution of gestational sac.  There is some irregular echogenic shadows that is likely blood in the lower uterine segment.    Diagnoses and all orders for this visit:    1. Complete  (Primary)    Patient reports that she is physically feeling much better.  She is coping well emotionally.  She does have to go back to work soon.  We discussed that she may continue to have some bleeding based on the appearance of the ultrasound.  Recommend Motrin as needed.  "

## 2024-08-19 ENCOUNTER — OFFICE VISIT (OUTPATIENT)
Dept: OBSTETRICS AND GYNECOLOGY | Age: 34
End: 2024-08-19
Payer: COMMERCIAL

## 2024-08-19 VITALS
DIASTOLIC BLOOD PRESSURE: 68 MMHG | SYSTOLIC BLOOD PRESSURE: 108 MMHG | HEIGHT: 66 IN | WEIGHT: 150 LBS | BODY MASS INDEX: 24.11 KG/M2

## 2024-08-19 DIAGNOSIS — Z11.3 SCREENING EXAMINATION FOR VENEREAL DISEASE: ICD-10-CM

## 2024-08-19 DIAGNOSIS — N92.6 IRREGULAR MENSES: Primary | ICD-10-CM

## 2024-08-19 DIAGNOSIS — Z01.419 ENCOUNTER FOR GYNECOLOGICAL EXAMINATION WITHOUT ABNORMAL FINDING: ICD-10-CM

## 2024-08-19 DIAGNOSIS — L65.9 HAIR LOSS: ICD-10-CM

## 2024-08-19 LAB
B-HCG UR QL: POSITIVE
EXPIRATION DATE: ABNORMAL
INTERNAL NEGATIVE CONTROL: NEGATIVE
INTERNAL POSITIVE CONTROL: POSITIVE
Lab: ABNORMAL

## 2024-08-19 PROCEDURE — 99395 PREV VISIT EST AGE 18-39: CPT | Performed by: OBSTETRICS & GYNECOLOGY

## 2024-08-19 PROCEDURE — 81025 URINE PREGNANCY TEST: CPT | Performed by: OBSTETRICS & GYNECOLOGY

## 2024-08-19 NOTE — PROGRESS NOTES
"Subjective     Chief Complaint   Patient presents with    Gynecologic Exam     AC        History of Present Illness    Mo Castro is a 34 y.o.  who presents for annual exam.  Patient's period is late.  She did have unprotected intercourse.  Patient has noticed hair loss.  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  She is here today with her daughter who is 2 years old.  Her menses are regular every 28-30 days, lasting  4 days  , dysmenorrhea mild, occurring first 1-2 days of flow   Obstetric History:  OB History          2    Para   1    Term   1       0    AB   1    Living   1         SAB   1    IAB   0    Ectopic   0    Molar        Multiple   0    Live Births   1               Menstrual History:     Patient's last menstrual period was 2024.         Current contraception: rhythm method  History of abnormal Pap smear: no  Received Gardasil immunization: yes  Perform regular self breast exam : no  Family history of uterine or ovarian cancer: yes - ovarian 76 MGM   Family History of colon cancer: no  Family history of breast cancer: yes - MGM 70  her genetic test neg   and mom's test neg     Mammogram: not indicated.  Colonoscopy: not indicated.  DEXA: not indicated.    Exercise: no   Calcium/Vitamin D: no     The following portions of the patient's history were reviewed and updated as appropriate: allergies, current medications, past family history, past medical history, past social history, past surgical history, and problem list.    Review of Systems        Objective   Physical Exam    /68   Ht 167.6 cm (66\")   Wt 68 kg (150 lb)   LMP 2024   BMI 24.21 kg/m²     General:   alert, appears stated age and cooperative   Neck: thyroid normal to palpation   Heart: regular rate and rhythm   Lungs: clear to auscultation bilaterally   Abdomen: soft, non-tender, without masses or organomegaly   Breast: inspection negative, no nipple discharge or bleeding, no masses or nodularity palpable "   Vulva: normal, Bartholin's, Urethra, [place]'s normal   Vagina: normal mucosa, normal discharge   Cervix: no cervical motion tenderness and no lesions   Uterus: non-tender, normal shape and consistency   Adnexa: no mass, fullness, tenderness   Rectal: not indicated     Assessment & Plan   Diagnoses and all orders for this visit:    1. Irregular menses (Primary)  -     POC Pregnancy, Urine  -     TSH Rfx On Abnormal To Free T4  -     OB Panel With HIV and RPR  -     Hepatitis C Antibody    2. Hair loss  -     TSH Rfx On Abnormal To Free T4  -     OB Panel With HIV and RPR  -     Hepatitis C Antibody    3. Encounter for gynecological examination without abnormal finding    4. Screening examination for venereal disease  -     Chlamydia trachomatis, Neisseria gonorrhoeae, PCR - Swab, Cervix    Positive urine pregnancy test today.  We will check labs and add thyroid due to hair loss  Viability ultrasound next week.  Patient did have a 6-week loss this year.  Pap smear is up-to-date    All questions answered.  Breast self exam technique reviewed and patient encouraged to perform self-exam monthly.  Discussed healthy lifestyle modifications.  Recommended 30 minutes of aerobic exercise five times per week.  Discussed calcium needs to prevent osteoporosis.

## 2024-08-20 LAB
ABO GROUP BLD: ABNORMAL
BASOPHILS # BLD AUTO: 0 X10E3/UL (ref 0–0.2)
BASOPHILS NFR BLD AUTO: 0 %
BLD GP AB SCN SERPL QL: NEGATIVE
EOSINOPHIL # BLD AUTO: 0.1 X10E3/UL (ref 0–0.4)
EOSINOPHIL NFR BLD AUTO: 1 %
ERYTHROCYTE [DISTWIDTH] IN BLOOD BY AUTOMATED COUNT: 11.1 % (ref 11.7–15.4)
HBV SURFACE AG SERPL QL IA: NEGATIVE
HCT VFR BLD AUTO: 41.8 % (ref 34–46.6)
HCV AB SERPL QL IA: NORMAL
HCV IGG SERPL QL IA: NON REACTIVE
HCV IGG SERPL QL IA: NORMAL
HGB BLD-MCNC: 13.6 G/DL (ref 11.1–15.9)
HIV 1+2 AB+HIV1 P24 AG SERPL QL IA: NON REACTIVE
IMM GRANULOCYTES # BLD AUTO: 0 X10E3/UL (ref 0–0.1)
IMM GRANULOCYTES NFR BLD AUTO: 0 %
LYMPHOCYTES # BLD AUTO: 1.8 X10E3/UL (ref 0.7–3.1)
LYMPHOCYTES NFR BLD AUTO: 27 %
MCH RBC QN AUTO: 30.6 PG (ref 26.6–33)
MCHC RBC AUTO-ENTMCNC: 32.5 G/DL (ref 31.5–35.7)
MCV RBC AUTO: 94 FL (ref 79–97)
MONOCYTES # BLD AUTO: 0.4 X10E3/UL (ref 0.1–0.9)
MONOCYTES NFR BLD AUTO: 6 %
NEUTROPHILS # BLD AUTO: 4.5 X10E3/UL (ref 1.4–7)
NEUTROPHILS NFR BLD AUTO: 66 %
PLATELET # BLD AUTO: 253 X10E3/UL (ref 150–450)
RBC # BLD AUTO: 4.44 X10E6/UL (ref 3.77–5.28)
RH BLD: POSITIVE
RPR SER QL: NON REACTIVE
RUBV IGG SERPL IA-ACNC: 4.25 INDEX
TSH SERPL DL<=0.005 MIU/L-ACNC: 0.93 UIU/ML (ref 0.45–4.5)
WBC # BLD AUTO: 6.8 X10E3/UL (ref 3.4–10.8)

## 2024-08-21 LAB
C TRACH RRNA SPEC QL NAA+PROBE: NEGATIVE
N GONORRHOEA RRNA SPEC QL NAA+PROBE: NEGATIVE

## 2024-08-26 ENCOUNTER — OFFICE VISIT (OUTPATIENT)
Dept: OBSTETRICS AND GYNECOLOGY | Age: 34
End: 2024-08-26
Payer: COMMERCIAL

## 2024-08-26 VITALS
SYSTOLIC BLOOD PRESSURE: 108 MMHG | DIASTOLIC BLOOD PRESSURE: 64 MMHG | HEIGHT: 66 IN | WEIGHT: 150 LBS | BODY MASS INDEX: 24.11 KG/M2

## 2024-08-26 DIAGNOSIS — G43.C0 PERIODIC HEADACHE SYNDROME, NOT INTRACTABLE: Primary | ICD-10-CM

## 2024-08-26 PROCEDURE — 99213 OFFICE O/P EST LOW 20 MIN: CPT | Performed by: OBSTETRICS & GYNECOLOGY

## 2024-08-26 RX ORDER — PROMETHAZINE HYDROCHLORIDE 25 MG/1
25 TABLET ORAL EVERY 6 HOURS PRN
Qty: 30 TABLET | Refills: 0 | Status: SHIPPED | OUTPATIENT
Start: 2024-08-26

## 2024-08-26 NOTE — PROGRESS NOTES
"  Chief complaint-early pregnancy with history of miscarriage    History of present illness- Patient is a 34 y.o.  who had no fetal heart tones when she came in at 9 weeks with her last pregnancy.  Crown-rump length was only 6 weeks.  Patient has had no vaginal bleeding.  She is taking prenatal vitamins.  She is here today with her  and daughter.      /64   Ht 167.6 cm (66\")   Wt 68 kg (150 lb)   LMP 2024   BMI 24.21 kg/m²   Physical Exam  Constitutional:       Appearance: Normal appearance.   Neurological:      Mental Status: She is alert.   Psychiatric:         Mood and Affect: Mood normal.         Thought Content: Thought content normal.         Judgment: Judgment normal.         Pelvic ultrasound shows viable riley intrauterine pregnancy.  Heart rate of 119.  Dates agree within 2 days but patient does have her irregular cycles.    Diagnoses and all orders for this visit:    1. Periodic headache syndrome, not intractable (Primary)    Other orders  -     promethazine (PHENERGAN) 25 MG tablet; Take 1 tablet by mouth Every 6 (Six) Hours As Needed for Nausea or Vomiting.  Dispense: 30 tablet; Refill: 0    Patient does have a history of headaches with pregnancy that responded to Phenergan and Tylenol and she would like that medication called in  Patient had prior miscarriage at about 6 weeks.  Ultrasound is reassuring today but we will recheck in 2 weeks.  Patient did have irregular menses so we will look at dates at her next visit also to decide on final EDC.  "

## 2024-09-09 ENCOUNTER — INITIAL PRENATAL (OUTPATIENT)
Dept: OBSTETRICS AND GYNECOLOGY | Age: 34
End: 2024-09-09
Payer: COMMERCIAL

## 2024-09-09 VITALS — SYSTOLIC BLOOD PRESSURE: 108 MMHG | WEIGHT: 150 LBS | BODY MASS INDEX: 24.21 KG/M2 | DIASTOLIC BLOOD PRESSURE: 76 MMHG

## 2024-09-09 DIAGNOSIS — Z34.90 PREGNANCY, UNSPECIFIED GESTATIONAL AGE: ICD-10-CM

## 2024-09-09 DIAGNOSIS — Z13.89 SCREENING FOR BLOOD OR PROTEIN IN URINE: Primary | ICD-10-CM

## 2024-09-09 LAB
GLUCOSE UR STRIP-MCNC: NEGATIVE MG/DL
PROT UR STRIP-MCNC: NEGATIVE MG/DL
VZV IGG SER QL: NORMAL

## 2024-09-09 PROCEDURE — 0501F PRENATAL FLOW SHEET: CPT | Performed by: OBSTETRICS & GYNECOLOGY

## 2024-09-09 NOTE — PROGRESS NOTES
Chief Complaint   Patient presents with    Initial Prenatal Visit       HPI: 34 y.o.  at 8w2d by sure LMP consistent with ultrasound today.  Patient is not having any vaginal bleeding.  She did have a prior miscarriage.  Her daughter was a full-term vaginal delivery.  Patient works as a .  She does have a history of headaches and has some Phenergan to help if she gets 1 but is doing well.  She is taking prenatal vitamins.    Full history is reviewed.    Relevant data reviewed:    Last OB US growth (since 2024)       None          Vitals:    24 1039   BP: 108/76   Weight: 68 kg (150 lb)     Total weight gain for pregnancy:  1.361 kg (3 lb)          Physical Exam  Constitutional:       General: She is not in acute distress.     Appearance: Normal appearance. She is not ill-appearing.   Cardiovascular:      Rate and Rhythm: Normal rate and regular rhythm.   Pulmonary:      Breath sounds: Normal breath sounds.   Neurological:      Mental Status: She is alert.   Psychiatric:         Mood and Affect: Mood normal.         Thought Content: Thought content normal.         Judgment: Judgment normal.         A/P  1. Intrauterine pregnancy at 8w2d   2. Pregnancy Risk:  NORMAL    Diagnoses and all orders for this visit:    1. Screening for blood or protein in urine (Primary)  -     POC Urinalysis Dipstick  -     Urine Culture - Urine, Urine, Random Void    2. Pregnancy, unspecified gestational age    New OB information was reviewed.  Patient desires cell free DNA testing.  Carrier testing was previously normal  Recommend vaccinations    Nutrition and weight gain were addressed.  Encouraged exercise at least 3 x weekly   Discussed Covid vaccination in pregnancy including CDC guidelines.  Vaccination strongly encouraged with risk of potential severe illness in unvaccinated.    -----------------------  PLAN:   Return in about 4 weeks (around 10/7/2024) for Recheck.    Martin Orta MD  2024 12:36 EDT

## 2024-09-14 LAB
BACTERIA UR CULT: ABNORMAL
BACTERIA UR CULT: ABNORMAL
OTHER ANTIBIOTIC SUSC ISLT: ABNORMAL

## 2024-09-16 RX ORDER — NITROFURANTOIN 25; 75 MG/1; MG/1
100 CAPSULE ORAL 2 TIMES DAILY
Qty: 14 CAPSULE | Refills: 0 | Status: SHIPPED | OUTPATIENT
Start: 2024-09-16

## 2024-09-16 RX ORDER — NITROFURANTOIN 25; 75 MG/1; MG/1
100 CAPSULE ORAL 2 TIMES DAILY
Qty: 14 CAPSULE | Refills: 0 | Status: SHIPPED | OUTPATIENT
Start: 2024-09-16 | End: 2024-09-23

## 2024-10-07 ENCOUNTER — ROUTINE PRENATAL (OUTPATIENT)
Dept: OBSTETRICS AND GYNECOLOGY | Age: 34
End: 2024-10-07
Payer: COMMERCIAL

## 2024-10-07 VITALS — SYSTOLIC BLOOD PRESSURE: 108 MMHG | BODY MASS INDEX: 24.37 KG/M2 | DIASTOLIC BLOOD PRESSURE: 66 MMHG | WEIGHT: 151 LBS

## 2024-10-07 DIAGNOSIS — Z34.82 PRENATAL CARE, SUBSEQUENT PREGNANCY, SECOND TRIMESTER: ICD-10-CM

## 2024-10-07 DIAGNOSIS — Z13.89 SCREENING FOR BLOOD OR PROTEIN IN URINE: Primary | ICD-10-CM

## 2024-10-07 DIAGNOSIS — Z3A.12 12 WEEKS GESTATION OF PREGNANCY: ICD-10-CM

## 2024-10-07 DIAGNOSIS — G43.C0 PERIODIC HEADACHE SYNDROME, NOT INTRACTABLE: ICD-10-CM

## 2024-10-07 LAB
GLUCOSE UR STRIP-MCNC: NEGATIVE MG/DL
PROT UR STRIP-MCNC: NEGATIVE MG/DL

## 2024-10-07 PROCEDURE — 90656 IIV3 VACC NO PRSV 0.5 ML IM: CPT | Performed by: OBSTETRICS & GYNECOLOGY

## 2024-10-07 PROCEDURE — 90471 IMMUNIZATION ADMIN: CPT | Performed by: OBSTETRICS & GYNECOLOGY

## 2024-10-07 PROCEDURE — 0502F SUBSEQUENT PRENATAL CARE: CPT | Performed by: OBSTETRICS & GYNECOLOGY

## 2024-10-07 NOTE — PROGRESS NOTES
CC- pregnancy    HPI: 34 y.o.  at 12w2d patient is feeling a little bit less fatigue.  She has had some headaches but they are improving.  No complaints today.  She got her flu vaccine but does not want to get a COVID-vaccine since she had a fever in the past after 1.    EXAM:  Last OB US growth (since 2024)       None          Vitals:    10/07/24 1204   BP: 108/66   Weight: 68.5 kg (151 lb)     Total weight gain for pregnancy:  1.814 kg (4 lb)  Weight gain for pregnancy is in the normal range  Positive urine culture.  Patient completed antibiotics  Doppler heart tones are positive    A/P  1. Intrauterine pregnancy at 12w2d   2. Pregnancy Risk:  NORMAL    Diagnoses and all orders for this visit:    1. Screening for blood or protein in urine (Primary)  -     POC Urinalysis Dipstick    2. Prenatal care, subsequent pregnancy, second trimester  -     Indy Fish Prenatal Test: Chromosomes 13, 18, 21, X & Y: Triploidy 22Q.11.2 Deletion - Blood,    3. 12 weeks gestation of pregnancy    4. Periodic headache syndrome, not intractable    Other orders  -     Fluzone >6mos ([id number])      -----------------------  Flu vaccine today.  Patient declines COVID-vaccine  Cell free DNA testing today.  Patient's carrier testing was previously done.      Martin Orta MD  10/7/2024 12:25 EDT

## 2024-11-11 ENCOUNTER — ROUTINE PRENATAL (OUTPATIENT)
Dept: OBSTETRICS AND GYNECOLOGY | Age: 34
End: 2024-11-11
Payer: COMMERCIAL

## 2024-11-11 VITALS — SYSTOLIC BLOOD PRESSURE: 118 MMHG | BODY MASS INDEX: 25.18 KG/M2 | DIASTOLIC BLOOD PRESSURE: 64 MMHG | WEIGHT: 156 LBS

## 2024-11-11 DIAGNOSIS — Z13.89 SCREENING FOR BLOOD OR PROTEIN IN URINE: Primary | ICD-10-CM

## 2024-11-11 DIAGNOSIS — Z34.82 ENCOUNTER FOR SUPERVISION OF OTHER NORMAL PREGNANCY IN SECOND TRIMESTER: ICD-10-CM

## 2024-11-11 DIAGNOSIS — Z3A.17 17 WEEKS GESTATION OF PREGNANCY: ICD-10-CM

## 2024-11-11 LAB
GLUCOSE UR STRIP-MCNC: NEGATIVE MG/DL
PROT UR STRIP-MCNC: NEGATIVE MG/DL

## 2024-11-11 PROCEDURE — 0502F SUBSEQUENT PRENATAL CARE: CPT | Performed by: OBSTETRICS & GYNECOLOGY

## 2024-11-11 NOTE — PROGRESS NOTES
CC- pregnancy    HPI: 34 y.o.  at 17w2d patient's headaches have improved.  She is feeling well.  She has felt some fetal movements.  She is here today with her  and her daughter.  She is planning on cutting back on doing surgeries as a bit later in the pregnancy.  Patient does have some nasal congestion.    EXAM:  Last OB US Data (since 2024)       None          Vitals:    24 1153   BP: 118/64   Weight: 70.8 kg (156 lb)     Total weight gain for pregnancy:  4.082 kg (9 lb)  Weight gain is in the normal range  No proteinuria  Doppler heart tones are positive  Cell free DNA was normal.  Patient plans to find out the gender at the time of the ultrasound.    A/P  1. Intrauterine pregnancy at 17w2d   2. Pregnancy Risk:  NORMAL    Diagnoses and all orders for this visit:    1. Screening for blood or protein in urine (Primary)  -     POC Urinalysis Dipstick    2. 17 weeks gestation of pregnancy  -     Alpha Fetoprotein, Maternal    3. Encounter for supervision of other normal pregnancy in second trimester      -----------------------  aFP today  Anatomy ultrasound at next visit and patient will find out the gender at that point  Patient has some nasal congestion.  She will continue Tylenol fluids and antihistamines.      Martin Orta MD  2024 12:13 EST

## 2024-11-13 LAB
AFP INTERP SERPL-IMP: NORMAL
AFP INTERP SERPL-IMP: NORMAL
AFP MOM SERPL: 1.34
AFP SERPL-MCNC: 41 NG/ML
AGE AT DELIVERY: 33.2 YR
GA METHOD: NORMAL
GA: 17.3 WEEKS
IDDM PATIENT QL: YES
LABORATORY COMMENT REPORT: NORMAL
MULTIPLE PREGNANCY: NO
NEURAL TUBE DEFECT RISK FETUS: 1276 %
RESULT: NORMAL

## 2024-12-09 ENCOUNTER — ROUTINE PRENATAL (OUTPATIENT)
Dept: OBSTETRICS AND GYNECOLOGY | Age: 34
End: 2024-12-09
Payer: COMMERCIAL

## 2024-12-09 VITALS — DIASTOLIC BLOOD PRESSURE: 74 MMHG | BODY MASS INDEX: 25.5 KG/M2 | WEIGHT: 158 LBS | SYSTOLIC BLOOD PRESSURE: 108 MMHG

## 2024-12-09 DIAGNOSIS — Z3A.21 21 WEEKS GESTATION OF PREGNANCY: ICD-10-CM

## 2024-12-09 DIAGNOSIS — Z13.89 SCREENING FOR BLOOD OR PROTEIN IN URINE: Primary | ICD-10-CM

## 2024-12-09 LAB
GLUCOSE UR STRIP-MCNC: NEGATIVE MG/DL
PROT UR STRIP-MCNC: NEGATIVE MG/DL

## 2024-12-09 PROCEDURE — 0502F SUBSEQUENT PRENATAL CARE: CPT | Performed by: OBSTETRICS & GYNECOLOGY

## 2024-12-09 NOTE — PROGRESS NOTES
CC- pregnancy    HPI: 34 y.o.  at 21w2d patient is here today for anatomy ultrasound.  No complaints.  She found out the baby is a girl today.    EXAM:  Last OB US Data (since 2024)         Value Time User    Fetal Survey  Incomplete 2024  5:30 PM Martin Orta MD    Placenta location  Posterior 2024  5:30 PM Martin Orta MD          Vitals:    24 1331   BP: 108/74   Weight: 71.7 kg (158 lb)     Total weight gain for pregnancy:  4.99 kg (11 lb)  Anatomy ultrasound shows normal but incomplete anatomy.  Size equal to dates.  Cervical length is normal at 4.8 cm.  Baby is a girl.  Posterior placenta with no previa  Weight gain for pregnancy is normal.    A/P  1. Intrauterine pregnancy at 21w2d   2. Pregnancy Risk:  NORMAL    Diagnoses and all orders for this visit:    1. Screening for blood or protein in urine (Primary)  -     POC Urinalysis Dipstick    2. 21 weeks gestation of pregnancy      -----------------------  Return in 4 weeks for completion of anatomy.  Recheck heart views and hands.  Patient found that the baby is a girl today      Martin Orta MD  2024 17:30 EST

## 2025-01-10 ENCOUNTER — ROUTINE PRENATAL (OUTPATIENT)
Dept: OBSTETRICS AND GYNECOLOGY | Age: 35
End: 2025-01-10
Payer: COMMERCIAL

## 2025-01-10 VITALS — SYSTOLIC BLOOD PRESSURE: 108 MMHG | BODY MASS INDEX: 26.95 KG/M2 | DIASTOLIC BLOOD PRESSURE: 74 MMHG | WEIGHT: 167 LBS

## 2025-01-10 DIAGNOSIS — G43.C0 PERIODIC HEADACHE SYNDROME, NOT INTRACTABLE: ICD-10-CM

## 2025-01-10 DIAGNOSIS — Z13.89 SCREENING FOR BLOOD OR PROTEIN IN URINE: Primary | ICD-10-CM

## 2025-01-10 DIAGNOSIS — Z3A.25 25 WEEKS GESTATION OF PREGNANCY: ICD-10-CM

## 2025-01-10 LAB
GLUCOSE UR STRIP-MCNC: NEGATIVE MG/DL
PROT UR STRIP-MCNC: ABNORMAL MG/DL

## 2025-01-10 NOTE — PROGRESS NOTES
CC- pregnancy    HPI: 34 y.o.  at 25w6d patient is feeling good fetal movements.  No complaints.  Here today for completion of anatomy ultrasound.  Patient did have 3 days of reflux that responded to Prilosec.  She stopped the Prilosec and symptoms have resolved.    EXAM:  Last OB US Data (since 2024)         Value Time User    EFW%ILE  16%ile 1/10/2025  8:47 AM Martin Orta MD    AC%ILE  15%ile 1/10/2025  8:47 AM Martin Orta MD    Fetal Survey  NL/Complete 1/10/2025  8:47 AM Martin Orta MD    Placenta location  Posterior 1/10/2025  8:47 AM Martin Orta MD          Vitals:    01/10/25 0812   BP: 108/74   Weight: 75.8 kg (167 lb)     Total weight gain for pregnancy:  9.072 kg (20 lb)  Weight gain for pregnancy is slightly high  Trace proteinuria  Ultrasound shows normal cardiac and hand views.  Estimated fetal weight is 1 pound 12 ounces at the 16th percentile.  Abdominal circumference at the 15th percentile.  Baby is vertex.    A/P  1. Intrauterine pregnancy at 25w6d   2. Pregnancy Risk:  NORMAL    Diagnoses and all orders for this visit:    1. Screening for blood or protein in urine (Primary)  -     POC Urinalysis Dipstick    2. 25 weeks gestation of pregnancy    3. Periodic headache syndrome, not intractable      -----------------------    Anatomy is complete  Weight at the 16th percentile and abdominal circumference at the 15th percentile.  Dates were checked and appear appropriate.  I will recheck fetal weight at about 30 weeks.  Follow-up in 2 weeks for third trimester labs    Martin Orta MD  1/10/2025 08:49 EST

## 2025-01-27 ENCOUNTER — ROUTINE PRENATAL (OUTPATIENT)
Dept: OBSTETRICS AND GYNECOLOGY | Age: 35
End: 2025-01-27
Payer: COMMERCIAL

## 2025-01-27 VITALS — WEIGHT: 169 LBS | DIASTOLIC BLOOD PRESSURE: 64 MMHG | BODY MASS INDEX: 27.28 KG/M2 | SYSTOLIC BLOOD PRESSURE: 108 MMHG

## 2025-01-27 DIAGNOSIS — Z13.1 SCREENING FOR DIABETES MELLITUS: ICD-10-CM

## 2025-01-27 DIAGNOSIS — Z3A.28 28 WEEKS GESTATION OF PREGNANCY: ICD-10-CM

## 2025-01-27 DIAGNOSIS — Z13.0 SCREENING FOR IRON DEFICIENCY ANEMIA: ICD-10-CM

## 2025-01-27 DIAGNOSIS — Z13.89 SCREENING FOR BLOOD OR PROTEIN IN URINE: Primary | ICD-10-CM

## 2025-01-27 LAB
GLUCOSE UR STRIP-MCNC: NEGATIVE MG/DL
PROT UR STRIP-MCNC: NEGATIVE MG/DL

## 2025-01-27 NOTE — PROGRESS NOTES
CC- pregnancy    HPI: 35 y.o.  at 28w2d patient is feeling good fetal movements.  No complaints today.    EXAM:  Last OB US Data (since 2024)         Value Time User    EFW%ILE  16%ile 1/10/2025  8:47 AM Martin Orta MD    AC%ILE  15%ile 1/10/2025  8:47 AM Martin Orta MD    Fetal Survey  NL/Complete 1/10/2025  8:47 AM Martin Orta MD    Placenta location  Posterior 1/10/2025  8:47 AM Martin Orta MD          Vitals:    25 1048   BP: 108/64   Weight: 76.7 kg (169 lb)     Total weight gain for pregnancy:  9.979 kg (22 lb)  Heart tones are positive and fundal height is appropriate  Weight gain for pregnancy is normal.    A/P  1. Intrauterine pregnancy at 28w2d   2. Pregnancy Risk:  NORMAL    Diagnoses and all orders for this visit:    1. Screening for blood or protein in urine (Primary)  -     POC Urinalysis Dipstick    2. Screening for iron deficiency anemia  -     CBC (No Diff)    3. Screening for diabetes mellitus  -     Gestational Screen 1 Hr ([facility])    4. 28 weeks gestation of pregnancy  -     RPR, Rfx Qn RPR / Confirm TP ([facility])    Other orders  -     Tdap Vaccine => 8yo IM (BOOSTRIX/ADACEL)      -----------------------  I am going to recheck the baby's weight at the patient's next visit because at the last weight the overall weight was at 16%.  Third trimester labs today.  Recommend kick counts.  Pediatrician recorded today.      Martin Orta MD  2025 11:18 EST

## 2025-01-28 LAB
ERYTHROCYTE [DISTWIDTH] IN BLOOD BY AUTOMATED COUNT: 11.7 % (ref 11.7–15.4)
GLUCOSE 1H P 50 G GLC PO SERPL-MCNC: 104 MG/DL (ref 70–139)
HCT VFR BLD AUTO: 38.6 % (ref 34–46.6)
HGB BLD-MCNC: 12.5 G/DL (ref 11.1–15.9)
MCH RBC QN AUTO: 31.8 PG (ref 26.6–33)
MCHC RBC AUTO-ENTMCNC: 32.4 G/DL (ref 31.5–35.7)
MCV RBC AUTO: 98 FL (ref 79–97)
PLATELET # BLD AUTO: 211 X10E3/UL (ref 150–450)
RBC # BLD AUTO: 3.93 X10E6/UL (ref 3.77–5.28)
RPR SER QL: NON REACTIVE
WBC # BLD AUTO: 9 X10E3/UL (ref 3.4–10.8)

## 2025-02-10 ENCOUNTER — ROUTINE PRENATAL (OUTPATIENT)
Dept: OBSTETRICS AND GYNECOLOGY | Age: 35
End: 2025-02-10
Payer: COMMERCIAL

## 2025-02-10 VITALS — SYSTOLIC BLOOD PRESSURE: 108 MMHG | WEIGHT: 170 LBS | BODY MASS INDEX: 27.44 KG/M2 | DIASTOLIC BLOOD PRESSURE: 74 MMHG

## 2025-02-10 DIAGNOSIS — Z3A.30 30 WEEKS GESTATION OF PREGNANCY: ICD-10-CM

## 2025-02-10 DIAGNOSIS — Z13.89 SCREENING FOR BLOOD OR PROTEIN IN URINE: Primary | ICD-10-CM

## 2025-02-10 LAB
GLUCOSE UR STRIP-MCNC: NEGATIVE MG/DL
PROT UR STRIP-MCNC: NEGATIVE MG/DL

## 2025-02-10 NOTE — PROGRESS NOTES
CC- pregnancy    HPI: 35 y.o.  at 30w2d patient is feeling good fetal movements.  No complaints today    EXAM:  Last OB US Data (since 2024)         Value Time User    EFW%ILE  14%ile 2/10/2025 12:01 PM Martin Orta MD    AC%ILE  22%ile 2/10/2025 12:01 PM Martin Orta MD    Fetal Survey  NL/Complete 1/10/2025  8:47 AM Martin Orta MD    Placenta location  Posterior 1/10/2025  8:47 AM Martin Orta MD          Vitals:    02/10/25 1131   BP: 108/74   Weight: 77.1 kg (170 lb)     Total weight gain for pregnancy:  10.4 kg (23 lb)  Gain for pregnancy is normal  Ultrasound shows an estimated fetal weight of 3 pounds 1 ounce at the 14th percentile.  Abdominal circumference at the 22nd percentile.  Head measurements are at the 3rd percentile.  CHEN is normal at 13 and baby is vertex  Third trimester labs were normal    A/P  1. Intrauterine pregnancy at 30w2d   2. Pregnancy Risk:  NORMAL    Diagnoses and all orders for this visit:    1. Screening for blood or protein in urine (Primary)  -     POC Urinalysis Dipstick    2. 30 weeks gestation of pregnancy      -----------------------  Fetal weight is in normal limits but is on the lower end and head measurements are small.  Recommend rechecking fetal weight at 34 weeks.  Recommend kick counts      Martin Orta MD  2/10/2025 12:01 EST

## 2025-02-24 ENCOUNTER — TELEPHONE (OUTPATIENT)
Dept: OBSTETRICS AND GYNECOLOGY | Age: 35
End: 2025-02-24

## 2025-02-24 NOTE — TELEPHONE ENCOUNTER
Caller: Mo Castro    Relationship to patient: Self    Best call back number: [phone]    Chief complaint: R/S    Type of visit: OB FOLLOW UP     Requested date: EARLY 02/28/25 8-9:30  03/03/25 ANYTIME      If rescheduling, when is the original appointment: 02/24/25     Additional notes:PATIENT IS SICK WITH THE FLU.

## 2025-02-24 NOTE — TELEPHONE ENCOUNTER
I think it is okay to keep her next appointment as long as she is feeling good fetal movements.  She should push fluid and take Tylenol.  If she would like to do Tamiflu let me know and I can send that in.  If she does have shortness of breath she should go to the hospital for evaluation on labor and delivery.

## 2025-02-26 ENCOUNTER — TELEPHONE (OUTPATIENT)
Dept: OBSTETRICS AND GYNECOLOGY | Age: 35
End: 2025-02-26

## 2025-02-26 NOTE — TELEPHONE ENCOUNTER
Caller: Mo Castro    Relationship: Self    Best call back number: [phone]    What is the best time to reach you: ANY    Who are you requesting to speak with (clinical staff, provider,  specific staff member): CLINICAL       What was the call regarding: PT IS NEEDING HER FMLA PAPERWORK BY [employer].   UNABLE TO WT; REQUESTING A CALL BACK TO DISCUSS

## 2025-03-10 ENCOUNTER — ROUTINE PRENATAL (OUTPATIENT)
Dept: OBSTETRICS AND GYNECOLOGY | Age: 35
End: 2025-03-10
Payer: COMMERCIAL

## 2025-03-10 VITALS — DIASTOLIC BLOOD PRESSURE: 74 MMHG | SYSTOLIC BLOOD PRESSURE: 110 MMHG | BODY MASS INDEX: 28.08 KG/M2 | WEIGHT: 174 LBS

## 2025-03-10 DIAGNOSIS — O36.5939 SGA (SMALL FOR GESTATIONAL AGE), FETAL, AFFECTING CARE OF MOTHER, ANTEPARTUM, THIRD TRIMESTER, OTHER FETUS: ICD-10-CM

## 2025-03-10 DIAGNOSIS — Z3A.34 34 WEEKS GESTATION OF PREGNANCY: ICD-10-CM

## 2025-03-10 DIAGNOSIS — Z13.89 SCREENING FOR BLOOD OR PROTEIN IN URINE: Primary | ICD-10-CM

## 2025-03-10 LAB
GLUCOSE UR STRIP-MCNC: NEGATIVE MG/DL
PROT UR STRIP-MCNC: NEGATIVE MG/DL

## 2025-03-10 NOTE — PROGRESS NOTES
CC- pregnancy    HPI: 35 y.o.  at 34w2d patient is feeling well.  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  Fetal movements are good.    EXAM:  Last OB US Data (since 2024)         Value Time User    EFW%ILE  31%ile 3/10/2025 11:29 AM Martin Orta MD    AC%ILE  47%ile 3/10/2025 11:29 AM Martin Orta MD    Fetal Survey  NL/Complete 1/10/2025  8:47 AM Martin Orta MD    Placenta location  Posterior 1/10/2025  8:47 AM Martin Orta MD          Vitals:    03/10/25 1111   BP: 110/74   Weight: 78.9 kg (174 lb)     Total weight gain for pregnancy:  12.2 kg (27 lb)  Weight gain for pregnancy is normal  Ultrasound shows estimated fetal weight of 5 pounds 1 ounce at the 31st percentile.  Abdominal circumference at the 47th percentile.  Head measurements are small at the fifth and 1st percentile.  Baby is vertex.  CHEN is normal at 16  Fundal height is 33 cm.  Abdomen is nontender.    A/P  1. Intrauterine pregnancy at 34w2d   2. Pregnancy Risk:  COMPLICATED    Diagnoses and all orders for this visit:    1. Screening for blood or protein in urine (Primary)  -     POC Urinalysis Dipstick    2. SGA (small for gestational age), fetal, affecting care of mother, antepartum, third trimester, other fetus  Overview:  Overall measurements are normal at the 31st percentile.  Abdominal circumference normal at 47 percentile but head measurements are small at the fifth and 1st percentile.    Orders:  -     Ambulatory Referral to [facility]/Perinatology    3. 34 weeks gestation of pregnancy      -----------------------  Head measurements are still small.  Overall growth is normal.  Patient has gained adequate weight for pregnancy.  No history of infections other than flu  MFM referral placed  Recommend kick counts  Follow-up in 2 weeks for BPP      Martin Orta MD  3/10/2025 11:30 EDT

## 2025-03-15 ENCOUNTER — PATIENT MESSAGE (OUTPATIENT)
Dept: OBSTETRICS AND GYNECOLOGY | Age: 35
End: 2025-03-15
Payer: COMMERCIAL

## 2025-03-19 ENCOUNTER — TRANSCRIBE ORDERS (OUTPATIENT)
Dept: ULTRASOUND IMAGING | Facility: HOSPITAL | Age: 35
End: 2025-03-19
Payer: COMMERCIAL

## 2025-03-19 DIAGNOSIS — O36.5939 SGA (SMALL FOR GESTATIONAL AGE), FETAL, AFFECTING CARE OF MOTHER, ANTEPARTUM, THIRD TRIMESTER, OTHER FETUS: Primary | ICD-10-CM

## 2025-03-24 ENCOUNTER — ROUTINE PRENATAL (OUTPATIENT)
Dept: OBSTETRICS AND GYNECOLOGY | Age: 35
End: 2025-03-24
Payer: COMMERCIAL

## 2025-03-24 VITALS — WEIGHT: 174 LBS | BODY MASS INDEX: 28.08 KG/M2 | DIASTOLIC BLOOD PRESSURE: 80 MMHG | SYSTOLIC BLOOD PRESSURE: 120 MMHG

## 2025-03-24 DIAGNOSIS — Z3A.36 36 WEEKS GESTATION OF PREGNANCY: ICD-10-CM

## 2025-03-24 DIAGNOSIS — Z36.85 ANTENATAL SCREENING FOR STREPTOCOCCUS B: ICD-10-CM

## 2025-03-24 DIAGNOSIS — Z13.89 SCREENING FOR BLOOD OR PROTEIN IN URINE: Primary | ICD-10-CM

## 2025-03-24 LAB
GLUCOSE UR STRIP-MCNC: NEGATIVE MG/DL
PROT UR STRIP-MCNC: NEGATIVE MG/DL

## 2025-03-24 PROCEDURE — 0502F SUBSEQUENT PRENATAL CARE: CPT | Performed by: OBSTETRICS & GYNECOLOGY

## 2025-03-24 NOTE — PROGRESS NOTES
CC- pregnancy    HPI: 35 y.o.  at 36w2d patient is feeling good fetal movements.  No complaints today.  She has appointment with MFM later this week due to small head measurements.  Overall growth at the baby last week was normal at 31st percentile.  Head measurements were at the fifth and 1st percentile.    EXAM:  Last OB US Data (since 2024)         Value Time User    EFW%ILE  31%ile 3/10/2025 11:29 AM Martin Orta MD    AC%ILE  47%ile 3/10/2025 11:29 AM Martin Orta MD    Fetal Survey  NL/Complete 1/10/2025  8:47 AM Martin Orta MD    Placenta location  Posterior 1/10/2025  8:47 AM Martin Orta MD          Vitals:    25 1406   BP: 120/80   Weight: 78.9 kg (174 lb)     Total weight gain for pregnancy:  12.2 kg (27 lb)  Biophysical profile is 8 out of 8.  CHEN is normal at 19 and baby is vertex  Group B strep swab is collected  Cervix is 1 cm 70% and -2 station  Fundal height is 35 cm    A/P  1. Intrauterine pregnancy at 36w2d   2. Pregnancy Risk:  COMPLICATED    Diagnoses and all orders for this visit:    1. Screening for blood or protein in urine (Primary)  -     POC Urinalysis Dipstick    2.  screening for streptococcus B  -     Strep Grp B FLAQUITO + Reflex - Swab, Vaginal/Rectum    3. 36 weeks gestation of pregnancy      -----------------------  Normal overall growth last week at the 31st percentile and abdominal circumference at the 47th percentile but head measurements were small at the fifth and 1st percentile.  Referral to [facility] was placed and patient will see them later this week.  BPP is reassuring.  Recommend kick counts.  Discussed possible induction of labor at 39 weeks.  Patient declines induction of labor        Martin Orta MD  3/24/2025 14:19 EDT

## 2025-03-26 LAB — GP B STREP DNA SPEC QL NAA+PROBE: NEGATIVE

## 2025-03-27 ENCOUNTER — OFFICE VISIT (OUTPATIENT)
Dept: OBSTETRICS AND GYNECOLOGY | Facility: CLINIC | Age: 35
End: 2025-03-27
Payer: COMMERCIAL

## 2025-03-27 ENCOUNTER — HOSPITAL ENCOUNTER (OUTPATIENT)
Dept: ULTRASOUND IMAGING | Facility: HOSPITAL | Age: 35
Discharge: HOME | End: 2025-03-27
Admitting: OBSTETRICS & GYNECOLOGY
Payer: COMMERCIAL

## 2025-03-27 VITALS
TEMPERATURE: 97.7 F | HEART RATE: 74 BPM | WEIGHT: 178 LBS | SYSTOLIC BLOOD PRESSURE: 112 MMHG | DIASTOLIC BLOOD PRESSURE: 68 MMHG | BODY MASS INDEX: 28.73 KG/M2 | OXYGEN SATURATION: 98 %

## 2025-03-27 DIAGNOSIS — O36.5939 SGA (SMALL FOR GESTATIONAL AGE), FETAL, AFFECTING CARE OF MOTHER, ANTEPARTUM, THIRD TRIMESTER, OTHER FETUS: ICD-10-CM

## 2025-03-27 DIAGNOSIS — Z34.93 PRENATAL CARE IN THIRD TRIMESTER: ICD-10-CM

## 2025-03-27 DIAGNOSIS — O36.5939 SGA (SMALL FOR GESTATIONAL AGE), FETAL, AFFECTING CARE OF MOTHER, ANTEPARTUM, THIRD TRIMESTER, OTHER FETUS: Primary | ICD-10-CM

## 2025-03-27 PROCEDURE — 76819 FETAL BIOPHYS PROFIL W/O NST: CPT

## 2025-03-27 PROCEDURE — 76811 OB US DETAILED SNGL FETUS: CPT

## 2025-03-27 NOTE — PROGRESS NOTES
MATERNAL FETAL MEDICINE Consult Note    Dear Dr Martin Orta MD:    Thank you for your kind referral of Mo Castro.  As you know, she is a 35 y.o.   36w5d gestation (Estimated Date of Delivery: 25). This is a consult.      Her antepartum course is complicated by:  - A recent ultrasound noteworthy for a small fetal head circumference  - She is a      Aneuploidy Screening:  Carrier Screening:    HPI: Today, she feels well, is in good spirits and accompanied by her . She does not have any new problems or concerns to report.     Review of History:  Past Medical History:   Diagnosis Date    Headache      Past Surgical History:   Procedure Laterality Date    TONSILLECTOMY         OB Hx:  OB History    Para Term  AB Living   3 1 1 0 1 1   SAB IAB Ectopic Molar Multiple Live Births   1 0 0  0 1      # Outcome Date GA Lbr Jose/2nd Weight Sex Type Anes PTL Lv   3 Current            2 SAB 2024 6w0d          1 Term 22 39w6d 15:31 / 00:38 3220 g (7 lb 1.6 oz) F Vag-Spont EPI N JOSÉ LUIS      Birth Comments: Scale 1       Social History     Socioeconomic History    Marital status:      Spouse name: Brian   Tobacco Use    Smoking status: Never     Passive exposure: Never    Smokeless tobacco: Never   Vaping Use    Vaping status: Never Used   Substance and Sexual Activity    Alcohol use: Not Currently     Alcohol/week: 2.0 standard drinks of alcohol     Types: 2 Glasses of wine per week    Drug use: No    Sexual activity: Yes     Partners: Male     Birth control/protection: None     Family History   Problem Relation Age of Onset    Hyperlipidemia Father     Breast cancer Maternal Grandmother 68    Ovarian cancer Maternal Grandmother 70    Lymphoma Paternal Grandmother     Hyperlipidemia Paternal Grandmother     Other Mother         BRCA negative - full panel neg       Allergies   Allergen Reactions    Amoxicillin Hives and Rash    Grass Hives      Current Outpatient  Medications on File Prior to Visit   Medication Sig Dispense Refill    cetirizine (zyrTEC) 10 MG tablet Take 1 tablet by mouth Daily.      Magnesium Gluconate (MAGNESIUM 27 PO) Take  by mouth.      Prenatal Vit w/Qq-Bwwewytcg-NI (PNV PO) Take  by mouth.      Probiotic Product (PROBIOTIC PO) Take  by mouth.      promethazine (PHENERGAN) 25 MG tablet Take 1 tablet by mouth Every 6 (Six) Hours As Needed for Nausea or Vomiting. 30 tablet 0    influenza vac split quad (FLUZONE,FLUARIX,AFLURIA,FLULAVAL) 0.5 ML suspension prefilled syringe injection Inject 0.5 mL into the appropriate muscle as directed by prescriber 1 (One) Time.       No current facility-administered medications on file prior to visit.        Past obstetric, gynecological, medical, surgical, family and social history reviewed.  Relevant lab work and imaging reviewed.      Vitals:    25 1255   BP: 112/68   BP Location: Right arm   Patient Position: Sitting   Pulse: 74   Temp: 97.7 °F (36.5 °C)   TempSrc: Temporal   SpO2: 98%   Weight: 80.7 kg (178 lb)       PHYSICAL EXAM   GENERAL: Not in acute distress, AAOx3, pleasant    NEURO: awake, alert and oriented to person, place, and time    LABS:   NIPT - low risk      ULTRASOUND   Please view full ultrasound note on Imaging tab in View[employer].  Cephalic, Posterior placenta  CHEN 13.8cm, BPP 8/8  EFW 2714 grams (6 lb 1oz) 28% AC 31%   Head Circumference 13% (best measurement). There are no intracranial calcifications, no hydrocephaly and no evidence of abnormal intracranial anatomy.  Fetal anatomy appears normal but some views are suboptimal due to late gestational age    ASSESSMENT/COUNSELIN y.o.   36w5d gestation (Estimated Date of Delivery: 25).   Small head circ on previous scan. Normal intracranial anatomy and growth today.       -Pregnancy  [ X ] stable  [   ] improving [  ] worsening    Diagnoses and all orders for this visit:    1. SGA (small for gestational age), fetal, affecting  care of mother, antepartum, third trimester, other fetus (Primary)  Overview:  Overall measurements are normal at the 31st percentile.  Abdominal circumference normal at 47 percentile but head measurements are small at the fifth and 1st percentile.      2. Prenatal care in third trimester         DISCUSSION:   - Reviewed difficulty with fetal head circ measurements in the late third trimester. Imaging is challenged by fetal position and obscuring of landmarks.  - Today's scan reveals head circ > 10th percentile and good fetal growth.   - Normal genetic screening and no family hx of genetic conditions or anomalies.   - No severe acute viral illnesses in the first trimester.   - Fetal prognosis is excellent. Scan should be considered normal.     Follow-up:   - Resume routine prenatal care  - No follow up with MFM scheduled, but I am happy to see for follow up at request of primary obstetrician  - Serial growth or testing is not required.     Thank you for the consult and opportunity to care for this patient.  Please feel free to reach out with any questions or concerns.      I spent 30 minutes caring for this patient on this date of service. This time includes time spent by me in the following activities: preparing for the visit, reviewing tests, obtaining and/or reviewing a separately obtained history, performing a medically appropriate examination and/or evaluation, counseling and educating the patient/family/caregiver and independently interpreting results and communicating that information with the patient/family/caregiver with greater than 50% spent in counseling and coordination of care. This time did NOT include time for interpretation of imaging or electronic fetal monitoring.     Guerrero Fernandez MD FACOG  Maternal Fetal Medicine-[facility]  Office: [phone]  [email or web address]

## 2025-03-27 NOTE — PROGRESS NOTES
Pt reports that she is doing well and denies vaginal bleeding, cramping, contractions or LOF at this time. Reports active fetal movement. Reviewed when to call OB office or present to L&D for evaluation with symptoms such as decreased fetal movement, vaginal bleeding, LOF or ctxs. Pt verbalized understanding. Denies HA, visual changes or epigastric pain. Denies any additional complaints at time of appointment. Next OB appointment scheduled for 3/31.    Vitals:    03/27/25 1255   BP: 112/68   Pulse: 74   Temp: 97.7 °F (36.5 °C)   SpO2: 98%

## 2025-03-27 NOTE — LETTER
2025     Martin Orta MD  [facility]  [address] [place], [place] [address]    Patient: Mo Castro   YOB: 1990   Date of Visit: 3/27/2025       Dear Martin Orta MD,    Thank you for referring Mo Castro to me for evaluation. Below is a copy of my consult note.    If you have questions, please do not hesitate to call me. I look forward to following Mo along with you.         Sincerely,        Guerrero Fernandez MD        CC: No Recipients    MATERNAL FETAL MEDICINE Consult Note    Dear Dr Martin Orta MD:    Thank you for your kind referral of Mo Castro.  As you know, she is a 35 y.o.   36w5d gestation (Estimated Date of Delivery: 25). This is a consult.      Her antepartum course is complicated by:  - A recent ultrasound noteworthy for a small fetal head circumference  - She is a      Aneuploidy Screening:  Carrier Screening:    HPI: Today, she feels well, is in good spirits and accompanied by her . She does not have any new problems or concerns to report.     Review of History:  Past Medical History:   Diagnosis Date   • Headache      Past Surgical History:   Procedure Laterality Date   • TONSILLECTOMY         OB Hx:  OB History    Para Term  AB Living   3 1 1 0 1 1   SAB IAB Ectopic Molar Multiple Live Births   1 0 0  0 1      # Outcome Date GA Lbr Jose/2nd Weight Sex Type Anes PTL Lv   3 Current            2 SAB 2024 6w0d          1 Term 22 39w6d 15:31 / 00:38 3220 g (7 lb 1.6 oz) F Vag-Spont EPI N JOSÉ LUIS      Birth Comments: Scale 1       Social History     Socioeconomic History   • Marital status:      Spouse name: Brian   Tobacco Use   • Smoking status: Never     Passive exposure: Never   • Smokeless tobacco: Never   Vaping Use   • Vaping status: Never Used   Substance and Sexual Activity   • Alcohol use: Not Currently     Alcohol/week: 2.0 standard drinks of alcohol     Types: 2 Glasses of wine per week   • Drug  use: No   • Sexual activity: Yes     Partners: Male     Birth control/protection: None     Family History   Problem Relation Age of Onset   • Hyperlipidemia Father    • Breast cancer Maternal Grandmother 68   • Ovarian cancer Maternal Grandmother 70   • Lymphoma Paternal Grandmother    • Hyperlipidemia Paternal Grandmother    • Other Mother         BRCA negative - full panel neg       Allergies   Allergen Reactions   • Amoxicillin Hives and Rash   • Grass Hives      Current Outpatient Medications on File Prior to Visit   Medication Sig Dispense Refill   • cetirizine (zyrTEC) 10 MG tablet Take 1 tablet by mouth Daily.     • Magnesium Gluconate (MAGNESIUM 27 PO) Take  by mouth.     • Prenatal Vit w/Jd-Rpfqhgdhe-LE (PNV PO) Take  by mouth.     • Probiotic Product (PROBIOTIC PO) Take  by mouth.     • promethazine (PHENERGAN) 25 MG tablet Take 1 tablet by mouth Every 6 (Six) Hours As Needed for Nausea or Vomiting. 30 tablet 0   • influenza vac split quad (FLUZONE,FLUARIX,AFLURIA,FLULAVAL) 0.5 ML suspension prefilled syringe injection Inject 0.5 mL into the appropriate muscle as directed by prescriber 1 (One) Time.       No current facility-administered medications on file prior to visit.        Past obstetric, gynecological, medical, surgical, family and social history reviewed.  Relevant lab work and imaging reviewed.      Vitals:    03/27/25 1255   BP: 112/68   BP Location: Right arm   Patient Position: Sitting   Pulse: 74   Temp: 97.7 °F (36.5 °C)   TempSrc: Temporal   SpO2: 98%   Weight: 80.7 kg (178 lb)       PHYSICAL EXAM   GENERAL: Not in acute distress, AAOx3, pleasant    NEURO: awake, alert and oriented to person, place, and time    LABS:   NIPT - low risk      ULTRASOUND   Please view full ultrasound note on Imaging tab in ViewPoint.  Cephalic, Posterior placenta  CHEN 13.8cm, BPP 8/8  EFW 2714 grams (6 lb 1oz) 28% AC 31%   Head Circumference 13% (best measurement). There are no intracranial calcifications, no  hydrocephaly and no evidence of abnormal intracranial anatomy.  Fetal anatomy appears normal but some views are suboptimal due to late gestational age    ASSESSMENT/COUNSELIN y.o.   36w5d gestation (Estimated Date of Delivery: 25).   Small head circ on previous scan. Normal intracranial anatomy and growth today.       -Pregnancy  [ X ] stable  [   ] improving [  ] worsening    Diagnoses and all orders for this visit:    1. SGA (small for gestational age), fetal, affecting care of mother, antepartum, third trimester, other fetus (Primary)  Overview:  Overall measurements are normal at the 31st percentile.  Abdominal circumference normal at 47 percentile but head measurements are small at the fifth and 1st percentile.      2. Prenatal care in third trimester         DISCUSSION:   - Reviewed difficulty with fetal head circ measurements in the late third trimester. Imaging is challenged by fetal position and obscuring of landmarks.  - Today's scan reveals head circ > 10th percentile and good fetal growth.   - Normal genetic screening and no family hx of genetic conditions or anomalies.   - No severe acute viral illnesses in the first trimester.   - Fetal prognosis is excellent. Scan should be considered normal.     Follow-up:   - Resume routine prenatal care  - No follow up with MFM scheduled, but I am happy to see for follow up at request of primary obstetrician  - Serial growth or testing is not required.     Thank you for the consult and opportunity to care for this patient.  Please feel free to reach out with any questions or concerns.      I spent 30 minutes caring for this patient on this date of service. This time includes time spent by me in the following activities: preparing for the visit, reviewing tests, obtaining and/or reviewing a separately obtained history, performing a medically appropriate examination and/or evaluation, counseling and educating the patient/family/caregiver and  independently interpreting results and communicating that information with the patient/family/caregiver with greater than 50% spent in counseling and coordination of care. This time did NOT include time for interpretation of imaging or electronic fetal monitoring.     Guerrero Fernandez MD FACOG  Maternal Fetal Medicine-[facility]  Office: [phone]  [email or web address]          Pt reports that she is doing well and denies vaginal bleeding, cramping, contractions or LOF at this time. Reports active fetal movement. Reviewed when to call OB office or present to L&D for evaluation with symptoms such as decreased fetal movement, vaginal bleeding, LOF or ctxs. Pt verbalized understanding. Denies HA, visual changes or epigastric pain. Denies any additional complaints at time of appointment. Next OB appointment scheduled for 3/31.    Vitals:    03/27/25 1255   BP: 112/68   Pulse: 74   Temp: 97.7 °F (36.5 °C)   SpO2: 98%

## 2025-03-31 ENCOUNTER — ROUTINE PRENATAL (OUTPATIENT)
Dept: OBSTETRICS AND GYNECOLOGY | Age: 35
End: 2025-03-31
Payer: COMMERCIAL

## 2025-03-31 VITALS — DIASTOLIC BLOOD PRESSURE: 82 MMHG | WEIGHT: 175 LBS | BODY MASS INDEX: 28.25 KG/M2 | SYSTOLIC BLOOD PRESSURE: 120 MMHG

## 2025-03-31 DIAGNOSIS — Z3A.37 37 WEEKS GESTATION OF PREGNANCY: ICD-10-CM

## 2025-03-31 DIAGNOSIS — Z13.89 SCREENING FOR BLOOD OR PROTEIN IN URINE: Primary | ICD-10-CM

## 2025-03-31 PROBLEM — O36.5939 SGA (SMALL FOR GESTATIONAL AGE), FETAL, AFFECTING CARE OF MOTHER, ANTEPARTUM, THIRD TRIMESTER, OTHER FETUS: Status: RESOLVED | Noted: 2025-03-10 | Resolved: 2025-03-31

## 2025-03-31 LAB
GLUCOSE UR STRIP-MCNC: NEGATIVE MG/DL
PROT UR STRIP-MCNC: NEGATIVE MG/DL

## 2025-03-31 PROCEDURE — 0502F SUBSEQUENT PRENATAL CARE: CPT | Performed by: OBSTETRICS & GYNECOLOGY

## 2025-03-31 NOTE — PROGRESS NOTES
CC- pregnancy    HPI: 35 y.o.  at 37w2d patient notes good fetal movements.  She is doing okay at work.    EXAM:  Last OB US Data (since 2024)         Value Time User    EFW%ILE  31%ile 3/10/2025 11:29 AM Martin Orta MD    AC%ILE  47%ile 3/10/2025 11:29 AM Martin Orta MD    Fetal Survey  NL/Complete 1/10/2025  8:47 AM Martin Orta MD    Placenta location  Posterior 1/10/2025  8:47 AM Martin Orta MD          Vitals:    25 1124   BP: 120/82   Weight: 79.4 kg (175 lb)     Total weight gain for pregnancy:  12.7 kg (28 lb)  Maternal-fetal medicine consult was reviewed.  On their ultrasound baby was at the 20th percentile.  Abdominal circumference at the 31st percentile.  Head measurements were at the 13th and 9th percentile.  Ultrasound was reassuring.  Doppler heart tones are positive  Fundal height is slightly low  GBS is negative  Cervix is 2 cm 70% and -2 station.    A/P  1. Intrauterine pregnancy at 37w2d   2. Pregnancy Risk:  NORMAL    Diagnoses and all orders for this visit:    1. Screening for blood or protein in urine (Primary)  -     POC Urinalysis Dipstick    2. 37 weeks gestation of pregnancy      -----------------------  The patient see high risk due to are smaller head measurements.  [facility] ultrasound was reassuring.  Dr. Fernandez did not recommend any  testing.  Recommend kick counts  Patient declines induction of labor.      Martin Orta MD  3/31/2025 11:40 EDT

## 2025-04-07 ENCOUNTER — ROUTINE PRENATAL (OUTPATIENT)
Dept: OBSTETRICS AND GYNECOLOGY | Age: 35
End: 2025-04-07
Payer: COMMERCIAL

## 2025-04-07 VITALS — WEIGHT: 177 LBS | BODY MASS INDEX: 28.57 KG/M2 | DIASTOLIC BLOOD PRESSURE: 74 MMHG | SYSTOLIC BLOOD PRESSURE: 122 MMHG

## 2025-04-07 DIAGNOSIS — Z34.90 PREGNANCY, UNSPECIFIED GESTATIONAL AGE: ICD-10-CM

## 2025-04-07 DIAGNOSIS — Z13.89 SCREENING FOR BLOOD OR PROTEIN IN URINE: Primary | ICD-10-CM

## 2025-04-07 LAB
GLUCOSE UR STRIP-MCNC: NEGATIVE MG/DL
PROT UR STRIP-MCNC: NEGATIVE MG/DL

## 2025-04-07 PROCEDURE — 0502F SUBSEQUENT PRENATAL CARE: CPT | Performed by: OBSTETRICS & GYNECOLOGY

## 2025-04-07 NOTE — PROGRESS NOTES
CC- pregnancy    HPI: 35 y.o.  at 38w2d patient is feeling active fetal movement.  No complaints.    EXAM:  Last OB US Data (since 2024)         Value Time User    EFW%ILE  31%ile 3/10/2025 11:29 AM Martin Orta MD    AC%ILE  47%ile 3/10/2025 11:29 AM Martin Orta MD    Fetal Survey  NL/Complete 1/10/2025  8:47 AM Martin Orta MD    Placenta location  Posterior 1/10/2025  8:47 AM Martin Orta MD          Vitals:    25 1118   BP: 122/74   Weight: 80.3 kg (177 lb)     Total weight gain for pregnancy:  13.6 kg (30 lb)  Cervix is 3 cm 70% of -2 station  Membrane sweep is done  No proteinuria today  Doppler heart tones are positive and fundal height is appropriate.    A/P  1. Intrauterine pregnancy at 38w2d   2. Pregnancy Risk:  NORMAL    Diagnoses and all orders for this visit:    1. Screening for blood or protein in urine (Primary)  -     POC Urinalysis Dipstick    2. Pregnancy, unspecified gestational age      -----------------------  Labor warnings given  Recommend kick counts  Patient declines induction of labor.      Martin Orta MD  2025 12:07 EDT

## 2025-04-15 ENCOUNTER — ROUTINE PRENATAL (OUTPATIENT)
Dept: OBSTETRICS AND GYNECOLOGY | Age: 35
End: 2025-04-15
Payer: COMMERCIAL

## 2025-04-15 VITALS — BODY MASS INDEX: 28.89 KG/M2 | DIASTOLIC BLOOD PRESSURE: 74 MMHG | SYSTOLIC BLOOD PRESSURE: 120 MMHG | WEIGHT: 179 LBS

## 2025-04-15 DIAGNOSIS — Z13.89 SCREENING FOR BLOOD OR PROTEIN IN URINE: Primary | ICD-10-CM

## 2025-04-15 DIAGNOSIS — Z34.90 PREGNANCY, UNSPECIFIED GESTATIONAL AGE: ICD-10-CM

## 2025-04-15 LAB
GLUCOSE UR STRIP-MCNC: NEGATIVE MG/DL
PROT UR STRIP-MCNC: NEGATIVE MG/DL

## 2025-04-15 PROCEDURE — 0502F SUBSEQUENT PRENATAL CARE: CPT | Performed by: OBSTETRICS & GYNECOLOGY

## 2025-04-15 NOTE — PROGRESS NOTES
CC- pregnancy    HPI: 35 y.o.  at 39w3d patient is feeling good fetal movements.  She does feel some pelvic pressure but no regular painful contractions.  She does not want to do an induction.  She does live in [place].  She did notice some puffiness of her foot but it went down with some Epsom salts    EXAM:  Last OB US Data (since 2024)         Value Time User    EFW%ILE  31%ile 3/10/2025 11:29 AM Martin Orta MD    AC%ILE  47%ile 3/10/2025 11:29 AM Martin Orta MD    Fetal Survey  NL/Complete 1/10/2025  8:47 AM Martin Orta MD    Placenta location  Posterior 1/10/2025  8:47 AM Martin Orta MD          Vitals:    04/15/25 1109   BP: 120/74   Weight: 81.2 kg (179 lb)     Total weight gain for pregnancy:  14.5 kg (32 lb)  Weight gain for pregnancy is normal  Doppler heart tones are positive and fundal height is appropriate  No proteinuria  Cervix is 4 cm 80% -2 station and stretchy.  Membrane sweep is done    A/P  1. Intrauterine pregnancy at 39w3d   2. Pregnancy Risk:  NORMAL    Diagnoses and all orders for this visit:    1. Screening for blood or protein in urine (Primary)  -     POC Urinalysis Dipstick    2. Pregnancy, unspecified gestational age      -----------------------  Patient declines induction of labor.  Membrane sweep done today  Recommend BPP this Friday since she will be 40 weeks this weekend.  Recommend kick counts        Martin Orta MD  4/15/2025 11:30 EDT

## 2025-04-17 ENCOUNTER — HOSPITAL ENCOUNTER (INPATIENT)
Facility: HOSPITAL | Age: 35
LOS: 2 days | Discharge: HOME | End: 2025-04-19
Attending: OBSTETRICS & GYNECOLOGY | Admitting: OBSTETRICS & GYNECOLOGY
Payer: COMMERCIAL

## 2025-04-17 LAB
A1 MICROGLOB PLACENTAL VAG QL: POSITIVE
BACTERIA UR QL AUTO: ABNORMAL /HPF
BILIRUB UR QL STRIP: NEGATIVE
CLARITY UR: CLEAR
COLOR UR: YELLOW
GLUCOSE UR STRIP-MCNC: NEGATIVE MG/DL
HGB UR QL STRIP.AUTO: NEGATIVE
HYALINE CASTS UR QL AUTO: ABNORMAL /LPF
KETONES UR QL STRIP: NEGATIVE
LEUKOCYTE ESTERASE UR QL STRIP.AUTO: ABNORMAL
NITRITE UR QL STRIP: NEGATIVE
PH UR STRIP.AUTO: 6.5 [PH] (ref 5–8)
PROT UR QL STRIP: NEGATIVE
RBC # UR STRIP: ABNORMAL /HPF
REF LAB TEST METHOD: ABNORMAL
SP GR UR STRIP: 1.01 (ref 1–1.03)
SQUAMOUS #/AREA URNS HPF: ABNORMAL /HPF
UROBILINOGEN UR QL STRIP: ABNORMAL
WBC # UR STRIP: ABNORMAL /HPF

## 2025-04-17 PROCEDURE — 87086 URINE CULTURE/COLONY COUNT: CPT | Performed by: OBSTETRICS & GYNECOLOGY

## 2025-04-17 PROCEDURE — 84112 EVAL AMNIOTIC FLUID PROTEIN: CPT | Performed by: OBSTETRICS & GYNECOLOGY

## 2025-04-17 PROCEDURE — 81001 URINALYSIS AUTO W/SCOPE: CPT | Performed by: OBSTETRICS & GYNECOLOGY

## 2025-04-17 RX ORDER — BUTORPHANOL TARTRATE 2 MG/ML
2 INJECTION, SOLUTION INTRAMUSCULAR; INTRAVENOUS
Status: DISCONTINUED | OUTPATIENT
Start: 2025-04-17 | End: 2025-04-18 | Stop reason: HOSPADM

## 2025-04-17 RX ORDER — SODIUM CHLORIDE 9 MG/ML
40 INJECTION, SOLUTION INTRAVENOUS AS NEEDED
Status: DISCONTINUED | OUTPATIENT
Start: 2025-04-17 | End: 2025-04-18 | Stop reason: HOSPADM

## 2025-04-17 RX ORDER — SODIUM CHLORIDE, SODIUM LACTATE, POTASSIUM CHLORIDE, CALCIUM CHLORIDE 600; 310; 30; 20 MG/100ML; MG/100ML; MG/100ML; MG/100ML
125 INJECTION, SOLUTION INTRAVENOUS CONTINUOUS
Status: DISCONTINUED | OUTPATIENT
Start: 2025-04-18 | End: 2025-04-18

## 2025-04-17 RX ORDER — ONDANSETRON 2 MG/ML
4 INJECTION INTRAMUSCULAR; INTRAVENOUS EVERY 6 HOURS PRN
Status: DISCONTINUED | OUTPATIENT
Start: 2025-04-17 | End: 2025-04-18 | Stop reason: HOSPADM

## 2025-04-17 RX ORDER — FAMOTIDINE 10 MG/ML
20 INJECTION, SOLUTION INTRAVENOUS 2 TIMES DAILY PRN
Status: DISCONTINUED | OUTPATIENT
Start: 2025-04-17 | End: 2025-04-18 | Stop reason: HOSPADM

## 2025-04-17 RX ORDER — FAMOTIDINE 20 MG/1
20 TABLET, FILM COATED ORAL 2 TIMES DAILY PRN
Status: DISCONTINUED | OUTPATIENT
Start: 2025-04-17 | End: 2025-04-18 | Stop reason: HOSPADM

## 2025-04-17 RX ORDER — MAGNESIUM CARB/ALUMINUM HYDROX 105-160MG
30 TABLET,CHEWABLE ORAL ONCE AS NEEDED
Status: DISCONTINUED | OUTPATIENT
Start: 2025-04-17 | End: 2025-04-18 | Stop reason: HOSPADM

## 2025-04-17 RX ORDER — SODIUM CHLORIDE 0.9 % (FLUSH) 0.9 %
10 SYRINGE (ML) INJECTION AS NEEDED
Status: DISCONTINUED | OUTPATIENT
Start: 2025-04-17 | End: 2025-04-18 | Stop reason: HOSPADM

## 2025-04-17 RX ORDER — SODIUM CHLORIDE 0.9 % (FLUSH) 0.9 %
10 SYRINGE (ML) INJECTION EVERY 12 HOURS SCHEDULED
Status: DISCONTINUED | OUTPATIENT
Start: 2025-04-18 | End: 2025-04-18 | Stop reason: HOSPADM

## 2025-04-17 RX ORDER — TERBUTALINE SULFATE 1 MG/ML
0.25 INJECTION SUBCUTANEOUS AS NEEDED
Status: DISCONTINUED | OUTPATIENT
Start: 2025-04-17 | End: 2025-04-18 | Stop reason: HOSPADM

## 2025-04-17 RX ORDER — ACETAMINOPHEN 325 MG/1
650 TABLET ORAL EVERY 4 HOURS PRN
Status: DISCONTINUED | OUTPATIENT
Start: 2025-04-17 | End: 2025-04-18 | Stop reason: HOSPADM

## 2025-04-17 RX ORDER — ONDANSETRON 4 MG/1
4 TABLET, ORALLY DISINTEGRATING ORAL EVERY 6 HOURS PRN
Status: DISCONTINUED | OUTPATIENT
Start: 2025-04-17 | End: 2025-04-18 | Stop reason: HOSPADM

## 2025-04-17 RX ORDER — LIDOCAINE HYDROCHLORIDE 10 MG/ML
0.5 INJECTION, SOLUTION INFILTRATION; PERINEURAL ONCE AS NEEDED
Status: DISCONTINUED | OUTPATIENT
Start: 2025-04-17 | End: 2025-04-18 | Stop reason: HOSPADM

## 2025-04-18 ENCOUNTER — ANESTHESIA (OUTPATIENT)
Dept: LABOR AND DELIVERY | Facility: HOSPITAL | Age: 35
End: 2025-04-18
Payer: COMMERCIAL

## 2025-04-18 ENCOUNTER — ANESTHESIA EVENT (OUTPATIENT)
Dept: LABOR AND DELIVERY | Facility: HOSPITAL | Age: 35
End: 2025-04-18
Payer: COMMERCIAL

## 2025-04-18 PROBLEM — O99.019 MATERNAL ANEMIA IN PREGNANCY, ANTEPARTUM: Status: ACTIVE | Noted: 2025-04-18

## 2025-04-18 LAB
ABO GROUP BLD: NORMAL
ALBUMIN SERPL-MCNC: 3.4 G/DL (ref 3.5–5.2)
ALBUMIN/GLOB SERPL: 1.4 G/DL
ALP SERPL-CCNC: 139 U/L (ref 39–117)
ALT SERPL W P-5'-P-CCNC: 10 U/L (ref 1–33)
ANION GAP SERPL CALCULATED.3IONS-SCNC: 12 MMOL/L (ref 5–15)
AST SERPL-CCNC: 16 U/L (ref 1–32)
BASOPHILS # BLD AUTO: 0.02 10*3/MM3 (ref 0–0.2)
BASOPHILS NFR BLD AUTO: 0.2 % (ref 0–1.5)
BILIRUB SERPL-MCNC: <0.2 MG/DL (ref 0–1.2)
BLD GP AB SCN SERPL QL: NEGATIVE
BUN SERPL-MCNC: 7 MG/DL (ref 6–20)
BUN/CREAT SERPL: 14.6 (ref 7–25)
CALCIUM SPEC-SCNC: 9.2 MG/DL (ref 8.6–10.5)
CHLORIDE SERPL-SCNC: 104 MMOL/L (ref 98–107)
CO2 SERPL-SCNC: 21 MMOL/L (ref 22–29)
CREAT SERPL-MCNC: 0.48 MG/DL (ref 0.57–1)
DEPRECATED RDW RBC AUTO: 42.8 FL (ref 37–54)
EGFRCR SERPLBLD CKD-EPI 2021: 128.4 ML/MIN/1.73
EOSINOPHIL # BLD AUTO: 0.03 10*3/MM3 (ref 0–0.4)
EOSINOPHIL NFR BLD AUTO: 0.3 % (ref 0.3–6.2)
ERYTHROCYTE [DISTWIDTH] IN BLOOD BY AUTOMATED COUNT: 12.6 % (ref 12.3–15.4)
GLOBULIN UR ELPH-MCNC: 2.4 GM/DL
GLUCOSE SERPL-MCNC: 94 MG/DL (ref 65–99)
HCT VFR BLD AUTO: 34.5 % (ref 34–46.6)
HGB BLD-MCNC: 11.4 G/DL (ref 12–15.9)
IMM GRANULOCYTES # BLD AUTO: 0.03 10*3/MM3 (ref 0–0.05)
IMM GRANULOCYTES NFR BLD AUTO: 0.3 % (ref 0–0.5)
LYMPHOCYTES # BLD AUTO: 2.19 10*3/MM3 (ref 0.7–3.1)
LYMPHOCYTES NFR BLD AUTO: 24 % (ref 19.6–45.3)
MCH RBC QN AUTO: 30.6 PG (ref 26.6–33)
MCHC RBC AUTO-ENTMCNC: 33 G/DL (ref 31.5–35.7)
MCV RBC AUTO: 92.7 FL (ref 79–97)
MONOCYTES # BLD AUTO: 0.48 10*3/MM3 (ref 0.1–0.9)
MONOCYTES NFR BLD AUTO: 5.3 % (ref 5–12)
NEUTROPHILS NFR BLD AUTO: 6.38 10*3/MM3 (ref 1.7–7)
NEUTROPHILS NFR BLD AUTO: 69.9 % (ref 42.7–76)
NRBC BLD AUTO-RTO: 0 /100 WBC (ref 0–0.2)
PLATELET # BLD AUTO: 181 10*3/MM3 (ref 140–450)
PMV BLD AUTO: 11 FL (ref 6–12)
POTASSIUM SERPL-SCNC: 3.5 MMOL/L (ref 3.5–5.2)
PROT SERPL-MCNC: 5.8 G/DL (ref 6–8.5)
RBC # BLD AUTO: 3.72 10*6/MM3 (ref 3.77–5.28)
RH BLD: POSITIVE
SODIUM SERPL-SCNC: 137 MMOL/L (ref 136–145)
T&S EXPIRATION DATE: NORMAL
TREPONEMA PALLIDUM IGG+IGM AB [PRESENCE] IN SERUM OR PLASMA BY IMMUNOASSAY: NORMAL
WBC NRBC COR # BLD AUTO: 9.13 10*3/MM3 (ref 3.4–10.8)

## 2025-04-18 PROCEDURE — 86850 RBC ANTIBODY SCREEN: CPT | Performed by: OBSTETRICS & GYNECOLOGY

## 2025-04-18 PROCEDURE — 80053 COMPREHEN METABOLIC PANEL: CPT | Performed by: OBSTETRICS & GYNECOLOGY

## 2025-04-18 PROCEDURE — 85025 COMPLETE CBC W/AUTO DIFF WBC: CPT | Performed by: OBSTETRICS & GYNECOLOGY

## 2025-04-18 PROCEDURE — 25010000002 FAMOTIDINE 10 MG/ML SOLUTION: Performed by: OBSTETRICS & GYNECOLOGY

## 2025-04-18 PROCEDURE — 25010000002 LIDOCAINE-EPINEPHRINE (PF) 1 %-1:200000 SOLUTION: Performed by: ANESTHESIOLOGY

## 2025-04-18 PROCEDURE — 0KQM0ZZ REPAIR PERINEUM MUSCLE, OPEN APPROACH: ICD-10-PCS | Performed by: OBSTETRICS & GYNECOLOGY

## 2025-04-18 PROCEDURE — 86901 BLOOD TYPING SEROLOGIC RH(D): CPT | Performed by: OBSTETRICS & GYNECOLOGY

## 2025-04-18 PROCEDURE — 86900 BLOOD TYPING SEROLOGIC ABO: CPT | Performed by: OBSTETRICS & GYNECOLOGY

## 2025-04-18 PROCEDURE — C1755 CATHETER, INTRASPINAL: HCPCS

## 2025-04-18 PROCEDURE — 25810000003 LACTATED RINGERS PER 1000 ML: Performed by: OBSTETRICS & GYNECOLOGY

## 2025-04-18 PROCEDURE — 59400 OBSTETRICAL CARE: CPT | Performed by: OBSTETRICS & GYNECOLOGY

## 2025-04-18 PROCEDURE — 25010000002 ONDANSETRON PER 1 MG: Performed by: ANESTHESIOLOGY

## 2025-04-18 PROCEDURE — C1755 CATHETER, INTRASPINAL: HCPCS | Performed by: ANESTHESIOLOGY

## 2025-04-18 PROCEDURE — 86780 TREPONEMA PALLIDUM: CPT | Performed by: OBSTETRICS & GYNECOLOGY

## 2025-04-18 RX ORDER — PHYTONADIONE 1 MG/.5ML
INJECTION, EMULSION INTRAMUSCULAR; INTRAVENOUS; SUBCUTANEOUS
Status: ACTIVE
Start: 2025-04-18 | End: 2025-04-18

## 2025-04-18 RX ORDER — OXYTOCIN/0.9 % SODIUM CHLORIDE 30/500 ML
250 PLASTIC BAG, INJECTION (ML) INTRAVENOUS CONTINUOUS
Status: DISPENSED | OUTPATIENT
Start: 2025-04-18 | End: 2025-04-18

## 2025-04-18 RX ORDER — PRENATAL VIT/IRON FUM/FOLIC AC 27MG-0.8MG
1 TABLET ORAL DAILY
Status: DISCONTINUED | OUTPATIENT
Start: 2025-04-18 | End: 2025-04-19 | Stop reason: HOSPADM

## 2025-04-18 RX ORDER — HYDROCODONE BITARTRATE AND ACETAMINOPHEN 10; 325 MG/1; MG/1
1 TABLET ORAL EVERY 4 HOURS PRN
Status: DISCONTINUED | OUTPATIENT
Start: 2025-04-18 | End: 2025-04-19 | Stop reason: HOSPADM

## 2025-04-18 RX ORDER — OXYTOCIN/0.9 % SODIUM CHLORIDE 30/500 ML
125 PLASTIC BAG, INJECTION (ML) INTRAVENOUS ONCE AS NEEDED
Status: DISCONTINUED | OUTPATIENT
Start: 2025-04-18 | End: 2025-04-19 | Stop reason: HOSPADM

## 2025-04-18 RX ORDER — MISOPROSTOL 200 UG/1
800 TABLET ORAL ONCE AS NEEDED
Status: DISCONTINUED | OUTPATIENT
Start: 2025-04-18 | End: 2025-04-18 | Stop reason: HOSPADM

## 2025-04-18 RX ORDER — DOCUSATE SODIUM 100 MG/1
100 CAPSULE, LIQUID FILLED ORAL 2 TIMES DAILY
Status: DISCONTINUED | OUTPATIENT
Start: 2025-04-18 | End: 2025-04-19 | Stop reason: HOSPADM

## 2025-04-18 RX ORDER — BISACODYL 10 MG
10 SUPPOSITORY, RECTAL RECTAL DAILY PRN
Status: DISCONTINUED | OUTPATIENT
Start: 2025-04-19 | End: 2025-04-19 | Stop reason: HOSPADM

## 2025-04-18 RX ORDER — HYDROCODONE BITARTRATE AND ACETAMINOPHEN 5; 325 MG/1; MG/1
1 TABLET ORAL EVERY 4 HOURS PRN
Status: DISCONTINUED | OUTPATIENT
Start: 2025-04-18 | End: 2025-04-19 | Stop reason: HOSPADM

## 2025-04-18 RX ORDER — OXYTOCIN/0.9 % SODIUM CHLORIDE 30/500 ML
999 PLASTIC BAG, INJECTION (ML) INTRAVENOUS ONCE
Status: DISCONTINUED | OUTPATIENT
Start: 2025-04-18 | End: 2025-04-18 | Stop reason: HOSPADM

## 2025-04-18 RX ORDER — SODIUM CHLORIDE 0.9 % (FLUSH) 0.9 %
1-10 SYRINGE (ML) INJECTION AS NEEDED
Status: DISCONTINUED | OUTPATIENT
Start: 2025-04-18 | End: 2025-04-19 | Stop reason: HOSPADM

## 2025-04-18 RX ORDER — HYDROCORTISONE 25 MG/G
1 CREAM TOPICAL AS NEEDED
Status: DISCONTINUED | OUTPATIENT
Start: 2025-04-18 | End: 2025-04-19 | Stop reason: HOSPADM

## 2025-04-18 RX ORDER — FENTANYL/ROPIVACAINE/NS/PF 2MCG/ML-.2
10 PLASTIC BAG, INJECTION (ML) INJECTION CONTINUOUS
Status: DISCONTINUED | OUTPATIENT
Start: 2025-04-18 | End: 2025-04-18

## 2025-04-18 RX ORDER — CETIRIZINE HYDROCHLORIDE 10 MG/1
10 TABLET ORAL DAILY
Status: DISCONTINUED | OUTPATIENT
Start: 2025-04-18 | End: 2025-04-19 | Stop reason: HOSPADM

## 2025-04-18 RX ORDER — CALCIUM CARBONATE 500 MG/1
2 TABLET, CHEWABLE ORAL 3 TIMES DAILY PRN
Status: DISCONTINUED | OUTPATIENT
Start: 2025-04-18 | End: 2025-04-19 | Stop reason: HOSPADM

## 2025-04-18 RX ORDER — ONDANSETRON 4 MG/1
4 TABLET, ORALLY DISINTEGRATING ORAL EVERY 8 HOURS PRN
Status: DISCONTINUED | OUTPATIENT
Start: 2025-04-18 | End: 2025-04-19 | Stop reason: HOSPADM

## 2025-04-18 RX ORDER — OXYTOCIN/0.9 % SODIUM CHLORIDE 30/500 ML
2-20 PLASTIC BAG, INJECTION (ML) INTRAVENOUS
Status: DISCONTINUED | OUTPATIENT
Start: 2025-04-18 | End: 2025-04-18

## 2025-04-18 RX ORDER — ERYTHROMYCIN 5 MG/G
OINTMENT OPHTHALMIC
Status: ACTIVE
Start: 2025-04-18 | End: 2025-04-18

## 2025-04-18 RX ORDER — CARBOPROST TROMETHAMINE 250 UG/ML
250 INJECTION, SOLUTION INTRAMUSCULAR
Status: DISCONTINUED | OUTPATIENT
Start: 2025-04-18 | End: 2025-04-18 | Stop reason: HOSPADM

## 2025-04-18 RX ORDER — EPHEDRINE SULFATE 50 MG/ML
5 INJECTION, SOLUTION INTRAVENOUS
Status: DISCONTINUED | OUTPATIENT
Start: 2025-04-18 | End: 2025-04-18 | Stop reason: HOSPADM

## 2025-04-18 RX ORDER — IBUPROFEN 600 MG/1
600 TABLET, FILM COATED ORAL EVERY 6 HOURS PRN
Status: DISCONTINUED | OUTPATIENT
Start: 2025-04-18 | End: 2025-04-19 | Stop reason: HOSPADM

## 2025-04-18 RX ORDER — TRANEXAMIC ACID 10 MG/ML
1000 INJECTION, SOLUTION INTRAVENOUS ONCE AS NEEDED
Status: DISCONTINUED | OUTPATIENT
Start: 2025-04-18 | End: 2025-04-18

## 2025-04-18 RX ORDER — ACETAMINOPHEN 325 MG/1
650 TABLET ORAL EVERY 6 HOURS PRN
Status: DISCONTINUED | OUTPATIENT
Start: 2025-04-18 | End: 2025-04-19 | Stop reason: HOSPADM

## 2025-04-18 RX ORDER — DIPHENHYDRAMINE HCL 25 MG
25 CAPSULE ORAL NIGHTLY PRN
Status: DISCONTINUED | OUTPATIENT
Start: 2025-04-18 | End: 2025-04-19 | Stop reason: HOSPADM

## 2025-04-18 RX ORDER — OXYTOCIN/0.9 % SODIUM CHLORIDE 30/500 ML
125 PLASTIC BAG, INJECTION (ML) INTRAVENOUS ONCE AS NEEDED
Status: COMPLETED | OUTPATIENT
Start: 2025-04-18 | End: 2025-04-18

## 2025-04-18 RX ORDER — ONDANSETRON 2 MG/ML
4 INJECTION INTRAMUSCULAR; INTRAVENOUS ONCE AS NEEDED
Status: COMPLETED | OUTPATIENT
Start: 2025-04-18 | End: 2025-04-18

## 2025-04-18 RX ORDER — METHYLERGONOVINE MALEATE 0.2 MG/ML
200 INJECTION INTRAVENOUS ONCE AS NEEDED
Status: DISCONTINUED | OUTPATIENT
Start: 2025-04-18 | End: 2025-04-18 | Stop reason: HOSPADM

## 2025-04-18 RX ADMIN — PRENATAL VITAMINS-IRON FUMARATE 27 MG IRON-FOLIC ACID 0.8 MG TABLET 1 TABLET: at 15:01

## 2025-04-18 RX ADMIN — ACETAMINOPHEN 650 MG: 325 TABLET, FILM COATED ORAL at 18:34

## 2025-04-18 RX ADMIN — LIDOCAINE HYDROCHLORIDE 3 ML: 10; .005 INJECTION, SOLUTION EPIDURAL; INFILTRATION; INTRACAUDAL; PERINEURAL at 03:04

## 2025-04-18 RX ADMIN — Medication 2 MILLI-UNITS/MIN: at 02:29

## 2025-04-18 RX ADMIN — Medication 10 ML/HR: at 03:12

## 2025-04-18 RX ADMIN — Medication: at 18:35

## 2025-04-18 RX ADMIN — SODIUM CHLORIDE, POTASSIUM CHLORIDE, SODIUM LACTATE AND CALCIUM CHLORIDE 125 ML/HR: 600; 310; 30; 20 INJECTION, SOLUTION INTRAVENOUS at 01:13

## 2025-04-18 RX ADMIN — ONDANSETRON 4 MG: 2 INJECTION, SOLUTION INTRAMUSCULAR; INTRAVENOUS at 08:31

## 2025-04-18 RX ADMIN — IBUPROFEN 600 MG: 600 TABLET ORAL at 22:12

## 2025-04-18 RX ADMIN — ACETAMINOPHEN 650 MG: 325 TABLET, FILM COATED ORAL at 12:10

## 2025-04-18 RX ADMIN — Medication 125 ML/HR: at 10:00

## 2025-04-18 RX ADMIN — DOCUSATE SODIUM 100 MG: 100 CAPSULE, LIQUID FILLED ORAL at 22:08

## 2025-04-18 RX ADMIN — CETIRIZINE HYDROCHLORIDE 10 MG: 10 TABLET, FILM COATED ORAL at 15:01

## 2025-04-18 RX ADMIN — FAMOTIDINE 20 MG: 10 INJECTION INTRAVENOUS at 08:31

## 2025-04-18 NOTE — LACTATION NOTE
3/7/2018 Last office visit, refill:    furosemide (LASIX) tablet 80 mg   4/21/2018     Sig - Route: Take 1 tablet by mouth daily. - Oral      Please advise.  Wt Readings from Last 1 Encounters:   04/23/18 79.5 kg        BP Readings from Last 2 Encounters:   04/23/18 148/82   04/20/18 118/70   ]    Lab Results   Component Value Date    SODIUM 135 04/23/2018    POTASSIUM 4.5 04/23/2018    CHLORIDE 98 04/23/2018    CO2 29 04/23/2018    BUN 44 (H) 04/23/2018    CREATININE 1.40 (H) 04/23/2018    GLUCOSE 169 (H) 04/23/2018     Hemoglobin A1C (%)   Date Value   02/26/2018 6.7 (H)     TSH (mcUnits/mL)   Date Value   04/22/2018 2.144     Lab Results   Component Value Date    CHOLESTEROL 188 02/20/2017    HDL 59 (L) 02/20/2017    CALCLDL 58 02/20/2017    TRIGLYCERIDE 354 (H) 02/20/2017     Lab Results   Component Value Date    AST 22 04/20/2018    GPT 24 04/20/2018    ALKPT 110 04/20/2018    BILIRUBIN 0.3 04/20/2018        Lactation Consult Note  P2 Term.  Reports baby is latching well.  Currently is latched to left breast in cross cradle hold and is shallow and had some nipple pain.  Mom was able to tell the difference between a deep vs shallow latch.  Broke latch with finger and then re latched baby independently and was much deeper and denied nipple pain.  BF first for 14 months but had some problems in the beginning.  Baby had oral ties and once repaired, latch was better and had a good supply.  Educated on colostrum first few days, to monitor hunger cues and latch on demand, expected output, and how to tell if baby is getting enough.  Has a personal pump.  Encouraged to call for assist as needed.  LC number on whiteboard.  Evaluation Completed: 2025 16:01 EDT  Patient Name: Mo Castro  :  1990  MRN:  [id number]     REFERRAL  INFORMATION:                          Date of Referral: 25   Person Making Referral: lactation consultant  Maternal Reason for Referral: previous breastfeeding issues       DELIVERY HISTORY:        Skin to skin initiation date/time: 2025 8:50 AM  Skin to skin end date/time: 2025 9:50 AM       MATERNAL ASSESSMENT:  Breast Size Issue: none (25 1545)  Breast Shape: Bilateral:, round (25 1545)  Breast Density: Bilateral:, soft (25 1545)  Areola: Bilateral:, elastic (25 1545)  Nipples: Bilateral:, graspable (25 1545)     Left Nipple Symptoms: intact (25 1545)  Right Nipple Symptoms: intact (25 1545)       INFANT ASSESSMENT:  Information for the patient's :  Minnie Castro [[id number]]   No past medical history on file.  Feeding Readiness Cues: quiet (25 1545)     Feeding Tolerance/Success: arousal required, intermittent pauses, coordinated suck/swallow/breathing, strong suck (25 1545)     Satiety Cues: calm after feeding, infant releases breast (25 1545)        Feeding Interventions: latch assistance provided  (25)  Nutrition Interventions: lactation consult initiated (25)           Breastfeeding: breastfeeding, left side only (25)  Infant Positioning: cross-cradle (25)        Effective Latch During Feeding: yes (25)  Suck/Swallow/Breathing Coordination: present (25)  Signs of Milk Transfer: deep jaw excursions noted (25)      Latch: 2-->grasps breast, tongue down, lips flanged, rhythmic sucking (25)  Audible Swallowin-->spontaneous and intermittent (24 hrs old) (25)  Type of Nipple: 2-->everted (after stimulation) (25)  Comfort (Breast/Nipple): 2-->soft/nontender (25)  Hold (Positioning): 2-->no assist from staff, mother able to position/hold infant (25)  Latch Score: 10 (25)                   MATERNAL INFANT FEEDING:     Maternal Emotional State: receptive, relaxed (25)  Infant Positioning: cross-cradle (25)   Signs of Milk Transfer: deep jaw excursions noted (25)  Pain with Feeding: yes (25)  Pain Location: nipple, left (25)  Pain Description:  (pinching) (25)  Comfort Measures Before/During Feeding: suction broken using finger, latch adjusted, infant position adjusted (25)  Milk Ejection Reflex: absent with colostrum (25)     Nipple Shape After Feeding, Left Breast: symmetrical (25)     Latch Assistance: verbal guidance offered (25)                               EQUIPMENT TYPE:                                 BREAST PUMPING:          LACTATION REFERRALS:

## 2025-04-18 NOTE — PLAN OF CARE
Goal Outcome Evaluation:  Plan of Care Reviewed With: patient        Progress: improving  Outcome Evaluation: Augmenting labor with pitocin. Comfortable with epidural. Anticipate

## 2025-04-18 NOTE — L&D DELIVERY NOTE
[facility]   Vaginal Delivery Note    Patient Name: Mo Castro  : 1990  MRN: [id number]    Date of Delivery: 2025    Diagnosis     Pre & Post-Delivery:  Intrauterine pregnancy at 39w6d  Labor status: Spontaneous Onset of Labor    Pregnancy    Maternal anemia in pregnancy, antepartum     (normal spontaneous vaginal delivery)             Problem List    Transfer to Postpartum     Review the Delivery Report for details.     Delivery     Delivery: Vaginal, Spontaneous    YOB: 2023   Time of Birth:  Gestational Age 8:49 AM  39w6d     Anesthesia: Epidural    Delivering clinician: Erlinda Roman   Forceps?   No   Vacuum? No    Shoulder dystocia present: No        Delivery narrative:  Mo Castro presented with spontaneous rupture of membranes.  She had augmentation with Pitocin and received an epidural for pain control.  The fetal tracing was reassuring throughout her course.  She progressed along a normal labor course and became complete and +1 station.  Pushing for about 10 minutes, she delivered a viable female  in right occiput anterior position.  Both shoulders delivered readily over a supported perineum.  The vigorous  was handed to mom for skin to skin and delayed cord clamping was performed.  The placenta delivered with gentle cord traction.  Uterine sweep confirmed no retained products.  A posterior midline second-degree laceration was repaired in usual fashion with 3-0 Vicryl.  Her bladder was drained using a red rubber catheter.  Her bleeding was minimal at the conclusion of the delivery and her baby was doing well also.      Infant     Findings: female infant     Infant observations: Weight: No birth weight on file.  Length:   in  Observations/Comments:  [email or web address] ling     Apgars: 8  @ 1 minute /    9  @ 5 minutes   Infant Name:      Placenta & Cord         Placenta delivered  Spontaneous at   2025  8:52 AM    Cord: 3 vessels present.   Nuchal  "Cord?  no   Cord blood obtained: Yes   Cord gases obtained:  No   Cord gas results: Venous:  No results found for: \"PHCVEN\", \"BECVEN\"    Arterial:  No results found for: \"PHCART\", \"BECART\"       Repair     Episiotomy: None       Lacerations: Yes  Laceration Information  Laceration Repaired?   Perineal: 2nd Yes   Periurethral:       Labial:       Sulcus:       Vaginal:       Cervical:         Suture used for repair: 3-0 Vicryl  Laceration Length for 3rd or 4th degreeN/A lacerations:    Estimated Blood Loss:  250 mL     Quantitative Blood Loss:    QBL from VAG DEL: 239 (04/18/25 0902)         Complications     none    Disposition     Mother to Mother Baby/Postpartum  in stable condition currently.  Baby to remains with mom  in stable condition currently.    Labs:    Lab Results   Component Value Date    RUBELLAABIGG 4.25 08/19/2024    VZIGG Pos H/O 09/09/2024           Erlinda Roman MD  04/18/25  09:11 EDT    "

## 2025-04-18 NOTE — OBED NOTES
[facility] [place] [facility] [place]    Patient Name: Mo Castrejon  YOB: 1990  MRN: [id number]  Admission Date: 2025 10:13 PM  Date of Service: 2025    Chief Complaint: Water broke        Subjective     Mo Castrejon is a 35 y.o. female  at 39w5d with Estimated Date of Delivery: 25 who presents with the chief complaint listed above.  Patient reports approximately 10 PM she had a gush of vaginal fluid followed by a trickle.  She has been having contractions on her way here that are irregular and when she is having them she is only ranking them a 1 out of 10 in discomfort.  Her last office exam she was 4 cm dilated and 80% effaced     She sees Martin Orta MD for her prenatal care. Her pregnancy has been complicated by: History of UTI, history of headaches, this baby is possibly SGA    She describes fetal movement as normal.  She admits to rupture of membranes.  She denies vaginal bleeding. She is feeling contractions.        Objective   Patient Active Problem List    Diagnosis     Prenatal care in third trimester [Z34.93]     Pregnancy [Z34.90]     Migraine [G43.909]         OB History    Para Term  AB Living   3 1 1 0 1 1   SAB IAB Ectopic Molar Multiple Live Births   1 0 0 0 0 1      # Outcome Date GA Lbr Jose/2nd Weight Sex Type Anes PTL Lv   3 Current            2 SAB 2024 6w0d          1 Term 22 39w6d 15:31 / 00:38 3220 g (7 lb 1.6 oz) F Vag-Spont EPI N JOSÉ LUIS      Birth Comments: Scale 1      Name: ALBERT CASTREJON      Apgar1: 8  Apgar5: 9        Past Medical History:   Diagnosis Date    Headache        Past Surgical History:   Procedure Laterality Date    TONSILLECTOMY         No current facility-administered medications on file prior to encounter.     Current Outpatient Medications on File Prior to Encounter   Medication Sig Dispense Refill    cetirizine (zyrTEC) 10 MG tablet Take 1 tablet by mouth Daily.      influenza vac split quad (FLUZONE,FLUARIX,AFLURIA,FLULAVAL)  0.5 ML suspension prefilled syringe injection Inject 0.5 mL into the appropriate muscle as directed by prescriber 1 (One) Time.      Prenatal Vit w/Ns-Okxgjuomx-KC (PNV PO) Take  by mouth.      Probiotic Product (PROBIOTIC PO) Take  by mouth.         Allergies   Allergen Reactions    Amoxicillin Hives and Rash    Grass Hives       Family History   Problem Relation Age of Onset    Hyperlipidemia Father     Breast cancer Maternal Grandmother 68    Ovarian cancer Maternal Grandmother 70    Lymphoma Paternal Grandmother     Hyperlipidemia Paternal Grandmother     Other Mother         BRCA negative - full panel neg        Social History     Socioeconomic History    Marital status:      Spouse name: Brian   Tobacco Use    Smoking status: Never     Passive exposure: Never    Smokeless tobacco: Never   Vaping Use    Vaping status: Never Used   Substance and Sexual Activity    Alcohol use: Not Currently     Alcohol/week: 2.0 standard drinks of alcohol     Types: 2 Glasses of wine per week    Drug use: No    Sexual activity: Yes     Partners: Male     Birth control/protection: None           Review of Systems   Constitutional:  Negative for chills and fever.   HENT: Negative.     Eyes:  Negative for photophobia and visual disturbance.   Respiratory:  Negative for shortness of breath.    Cardiovascular:  Negative for chest pain.   Gastrointestinal:  Positive for abdominal pain. Negative for nausea.   Genitourinary:  Positive for vaginal discharge.   Psychiatric/Behavioral:  The patient is not nervous/anxious.           PHYSICAL EXAM:      VITAL SIGNS:  There were no vitals filed for this visit.         FHT'S:    130 with moderate variability and accelerations                                     PHYSICAL EXAM:      General: well developed; well nourished  no acute distress  mentation appropriate   Heart: Not performed.   Lungs   breathing is unlabored   Abdomen: Gravid and non tender     Extremities: trace edema, DTRs 1  plus, no clonus       Cervix: Per RN        Contractions:   irregular                    LABS AND TESTING ORDERED:  Uterine and fetal monitoring  Urinalysis  ROM plus, serial cervical exams    LAB RESULTS:    No results found for this or any previous visit (from the past 24 hours).    Lab Results   Component Value Date    ABO O 08/19/2024    RH Positive 08/19/2024       Lab Results   Component Value Date    STREPGPB Negative 03/24/2025                 External Prenatal Results       Pregnancy Outside Results - Transcribed From Office Records - See Scanned Records For Details       Test Value Date Time    ABO  O  08/19/24 1247    Rh  Positive  08/19/24 1247    Antibody Screen  Negative  08/19/24 1247    Varicella IgG ^ Pos H/O  09/09/24     Rubella  4.25 index 08/19/24 1247    Hgb  12.5 g/dL 01/27/25 1152       13.6 g/dL 08/19/24 1247    Hct  38.6 % 01/27/25 1152       41.8 % 08/19/24 1247    HgB A1c        1h GTT  104 mg/dL 01/27/25 1152    3h GTT Fasting       3h GTT 1 hour       3h GTT 2 hour       3h GTT 3 hour        Gonorrhea (discrete)  Negative  08/19/24 1334    Chlamydia (discrete)  Negative  08/19/24 1334    RPR  Non Reactive  01/27/25 1152       Non Reactive  08/19/24 1247    Syphils cascade: TP-Ab (FTA)       TP-Ab       TP-Ab (EIA)       TPPA       HBsAg  Negative  08/19/24 1247    Herpes Simplex Virus PCR       Herpes Simplex VIrus Culture       HIV  Non Reactive  08/19/24 1247    Hep C RNA Quant PCR       Hep C Antibody  CANCELED  08/19/24 1247    AFP  41.0 ng/mL 11/11/24 1228    NIPT       Cystic Fibrosis ([place])       Cystic Fibroisis        Spinal Muscular atrophy       Fragile X       Group B Strep  Negative  03/24/25 1428    GBS Susceptibility to Clindamycin       GBS Susceptibility to Erythromycin       Fetal Fibronectin       Genetic Testing, Maternal Blood                 Drug Screening       Test Value Date Time    Urine Drug Screen       Amphetamine Screen       Barbiturate Screen        Benzodiazepine Screen       Methadone Screen       Phencyclidine Screen       Opiates Screen       THC Screen       Cocaine Screen       Propoxyphene Screen       Buprenorphine Screen       Methamphetamine Screen       Oxycodone Screen       Tricyclic Antidepressants Screen                 Legend    ^: Historical                              Impression:   @ 39w5d .  Final Diagnosis: SROM, not having regular painful contractions    Plan:  1.  Dr. Lola Castro to be notified, fetal and uterine monitoring  continuously, expectant management, labor augmentation  Pitocin, and analgesia with  epidural        Jaida Rivas MD  2025  22:53 EDT

## 2025-04-18 NOTE — ANESTHESIA PROCEDURE NOTES
Labor Epidural      Patient reassessed immediately prior to procedure    Patient location during procedure: OB  Start Time: 4/18/2025 2:45 AM  Stop Time: 4/18/2025 3:04 AM  Performed By  Anesthesiologist: Sona Cummins MD  Preanesthetic Checklist  Completed: patient identified, IV checked, site marked, risks and benefits discussed, surgical consent, monitors and equipment checked, pre-op evaluation and timeout performed  Prep:  Pt Position:sitting  Sterile Tech:cap, gloves and mask  Prep:chlorhexidine gluconate and isopropyl alcohol  Monitoring:blood pressure monitoring and continuous pulse oximetry  Epidural Block Procedure:  Approach:midline  Guidance:landmark technique and palpation technique  Location:L3-L4  Needle Type:Tuohy  Needle Gauge:19 G  Loss of Resistance Medium: air (Mixed air and saline)  Loss of Resistance: 6cm  Cath Depth at skin:12 cm  Paresthesia: none  Aspiration:negative  Test Dose:negative  Number of Attempts: 3  Post Assessment:  Dressing:occlusive dressing applied and secured with tape  Pt Tolerance:patient tolerated the procedure well with no apparent complications  Complications:no

## 2025-04-18 NOTE — PLAN OF CARE
Goal Outcome Evaluation:      Vss, breastfeeding and bonding well, voiding, tylenol for pain

## 2025-04-18 NOTE — ANESTHESIA PREPROCEDURE EVALUATION
Anesthesia Evaluation     NPO Solid Status: > 8 hours  NPO Liquid Status: > 2 hours           Airway   Mallampati: II  TM distance: >3 FB  Neck ROM: full  Dental      Pulmonary    Cardiovascular     Rhythm: regular  Rate: normal        Neuro/Psych  (+) headaches  GI/Hepatic/Renal/Endo      Musculoskeletal     Abdominal    Substance History      OB/GYN    (+) Pregnant        Other                    Anesthesia Plan    ASA 2     epidural     (IUP at 39w6d for labor epidural )    Anesthetic plan, risks, benefits, and alternatives have been provided, discussed and informed consent has been obtained with: patient.    CODE STATUS:    Code Status (Patient has no pulse and is not breathing): CPR (Attempt to Resuscitate)  Medical Interventions (Patient has pulse or is breathing): Full Support  Level Of Support Discussed With: Patient

## 2025-04-18 NOTE — H&P
[facility]  Obstetric History and Physical    Chief Complaint   Patient presents with    Rupture of Membranes     Gush at 2200.  A few contractions noted on the way to hospital.  +FM denies bleeding       Subjective     Patient is a 35 y.o. female  currently at 39w6d, who presents with early labor with rupture of membranes.    Her prenatal care is benign.  Her previous obstetric/gynecological history is noted for is non-contributory.    The following portions of the patients history were reviewed and updated as appropriate: current medications, allergies, past medical history, past surgical history, past family history, past social history, and problem list .       Prenatal Information:  Prenatal Results       Initial Prenatal Labs       Test Value Reference Range Date Time    Hemoglobin  13.6 g/dL 11.1 - 15.9 24 1247    Hematocrit  41.8 % 34.0 - 46.6 24 1247    Platelets  253 x10E3/uL 150 - 450 24 1247    Rubella IgG  4.25 index Immune >0.99 24 1247    Hepatitis B SAg  Negative  Negative 24 1247    Hepatitis C Ab  CANCELED   24 1247    RPR  Non Reactive  Non Reactive 25 1152       Non Reactive  Non Reactive 24 1247    T. Pallidum Ab         ABO  O   24 1247    Rh  Positive   24 1247    Antibody Screen  Negative  Negative 24 1247    HIV  Non Reactive  Non Reactive 24 1247    Urine Culture  Final report   24 1610    Gonorrhea  Negative  Negative 24 1334    Chlamydia  Negative  Negative 24 1334    TSH  0.934 uIU/mL 0.450 - 4.500 24 1247    HgB A1c         Varicella IgG ^ Pos H/O   24     Hemoglobinopathy Fractionation        Hemoglobinopathy (genetic testing)        Cystic fibrosis         Spinal muscular atrophy        Fragile X                  Fetal testing        Test Value Reference Range Date Time    NIPT        MSAFP  *Screen Negative*   24 1228    AFP-4                  2nd and 3rd Trimester        Test Value Reference Range Date Time    Hemoglobin (repeated)  11.4 g/dL 12.0 - 15.9 04/18/25 0007       12.5 g/dL 11.1 - 15.9 01/27/25 1152    Hematocrit (repeated)  34.5 % 34.0 - 46.6 04/18/25 0007       38.6 % 34.0 - 46.6 01/27/25 1152    Platelets   181 10*3/mm3 140 - 450 04/18/25 0007       211 x10E3/uL 150 - 450 01/27/25 1152       253 x10E3/uL 150 - 450 08/19/24 1247    1 hour GTT   104 mg/dL 70 - 139 01/27/25 1152    Antibody Screen (repeated)        3rd TM syphilis scrn (repeated)  RPR   Non Reactive  Non Reactive 01/27/25 1152    3rd TM syphilis scrn (repeated) TP-Ab        3rd TM syphilis screen TB-Ab (FTA)        Syphilis cascade test TP-Ab (EIA)        Syphilis cascade TPPA        GTT Fasting        GTT 1 Hr        GTT 2 Hr        GTT 3 Hr        Group B Strep  Negative  Negative 03/24/25 1428              Other testing        Test Value Reference Range Date Time    Parvo IgG         CMV IgG                   Drug Screening       Test Value Reference Range Date Time    Amphetamine Screen        Barbiturate Screen        Benzodiazepine Screen        Methadone Screen        Phencyclidine Screen        Opiates Screen        THC Screen        Cocaine Screen        Propoxyphene Screen        Buprenorphine Screen        Methamphetamine Screen        Oxycodone Screen        Tricyclic Antidepressants Screen                  Legend    ^: Historical                          External Prenatal Results       Pregnancy Outside Results - Transcribed From Office Records - See Scanned Records For Details       Test Value Date Time    ABO  O  08/19/24 1247    Rh  Positive  08/19/24 1247    Antibody Screen  Negative  08/19/24 1247    Varicella IgG ^ Pos H/O  09/09/24     Rubella  4.25 index 08/19/24 1247    Hgb  11.4 g/dL 04/18/25 0007       12.5 g/dL 01/27/25 1152       13.6 g/dL 08/19/24 1247    Hct  34.5 % 04/18/25 0007       38.6 % 01/27/25 1152       41.8 % 08/19/24 1247    HgB A1c        1h GTT  104 mg/dL  25 1152    3h GTT Fasting       3h GTT 1 hour       3h GTT 2 hour       3h GTT 3 hour        Gonorrhea (discrete)  Negative  24 1334    Chlamydia (discrete)  Negative  24 1334    RPR  Non Reactive  25 1152       Non Reactive  24 1247    Syphils cascade: TP-Ab (FTA)       TP-Ab       TP-Ab (EIA)       TPPA       HBsAg  Negative  24 1247    Herpes Simplex Virus PCR       Herpes Simplex VIrus Culture       HIV  Non Reactive  24 1247    Hep C RNA Quant PCR       Hep C Antibody  CANCELED  24 1247    AFP  41.0 ng/mL 24 1228    NIPT       Cystic Fibrosis ([place])       Cystic Fibroisis        Spinal Muscular atrophy       Fragile X       Group B Strep  Negative  25 1428    GBS Susceptibility to Clindamycin       GBS Susceptibility to Erythromycin       Fetal Fibronectin       Genetic Testing, Maternal Blood                 Drug Screening       Test Value Date Time    Urine Drug Screen       Amphetamine Screen       Barbiturate Screen       Benzodiazepine Screen       Methadone Screen       Phencyclidine Screen       Opiates Screen       THC Screen       Cocaine Screen       Propoxyphene Screen       Buprenorphine Screen       Methamphetamine Screen       Oxycodone Screen       Tricyclic Antidepressants Screen                 Legend    ^: Historical                             Past OB History:     OB History    Para Term  AB Living   3 1 1 0 1 1   SAB IAB Ectopic Molar Multiple Live Births   1 0 0 0 0 1      # Outcome Date GA Lbr Jose/2nd Weight Sex Type Anes PTL Lv   3 Current            2 SAB 2024 6w0d          1 Term 22 39w6d 15:31 / 00:38 3220 g (7 lb 1.6 oz) F Vag-Spont EPI N JOSÉ LUIS      Birth Comments: Scale 1      Name: ALBERT CASTREJON      Apgar1: 8  Apgar5: 9       Past Medical History: Past Medical History:   Diagnosis Date    Headache     Migraine       Past Surgical History Past Surgical History:   Procedure Laterality Date     TONSILLECTOMY        Family History: Family History   Problem Relation Age of Onset    Hyperlipidemia Father     Breast cancer Maternal Grandmother 68    Ovarian cancer Maternal Grandmother 70    Lymphoma Paternal Grandmother     Hyperlipidemia Paternal Grandmother     Other Mother         BRCA negative - full panel neg       Social History:  reports that she has never smoked. She has never been exposed to tobacco smoke. She has never used smokeless tobacco.   reports that she does not currently use alcohol after a past usage of about 2.0 standard drinks of alcohol per week.   reports no history of drug use.        General ROS: Pertinent items are noted in HPI, all other systems reviewed and negative    Objective       Vital Signs Range for the last 24 hours  Temperature: Temp:  [98.1 °F (36.7 °C)] 98.1 °F (36.7 °C)   Temp Source: Temp src: Oral   BP: BP: (106-107)/(69-75) 107/69   Pulse: Heart Rate:  [65-68] 68   Respirations: Resp:  [16] 16   SPO2: SpO2:  [97 %] 97 %   O2 Amount (l/min):     O2 Devices     Weight: Weight:  [81.2 kg (179 lb)-84.2 kg (185 lb 9.6 oz)] 84.2 kg (185 lb 9.6 oz)     Physical Examination: General appearance - alert, well appearing, and in no distress  Neck - supple, no significant adenopathy  Chest - no tachypnea, retractions or cyanosis  Heart - normal rate and regular rhythm  Abdomen -gravid and nontender  Pelvic -80/4/-2  Neurological - alert, oriented, normal speech, no focal findings or movement disorder noted  Extremities - peripheral pulses normal, no pedal edema, no clubbing or cyanosis  Skin - normal coloration and turgor, no rashes, no suspicious skin lesions noted    Presentation: vertex   Cervix: Exam by: Method: sterile vaginal exam performed   Dilation: Cervical Dilation (cm): 3-4   Effacement: Cervical Effacement: 80   Station:         Fetal Heart Rate Assessment   Method: Fetal HR Assessment Method: external   Beats/min:     Baseline:     Variability:     Accels:      Decels:     Tracing Category:       Uterine Assessment   Method: Method: external tocotransducer   Frequency (min):     Ctx Count in 10 min:     Duration:     Intensity:     Intensity by IUPC:     Resting Tone:     Resting Tone by IUPC:     [place] Units:       Laboratory Results:   Lab Results (last 24 hours)       Procedure Component Value Units Date/Time    CBC & Differential [[id number]]  (Abnormal) Collected: 04/18/25 0007    Specimen: Blood Updated: 04/18/25 0027    Narrative:      The following orders were created for panel order CBC & Differential.  Procedure                               Abnormality         Status                     ---------                               -----------         ------                     CBC Auto Differential[[id number]]        Abnormal            Final result                 Please view results for these tests on the individual orders.    CBC Auto Differential [[id number]]  (Abnormal) Collected: 04/18/25 0007    Specimen: Blood Updated: 04/18/25 0027     WBC 9.13 10*3/mm3      RBC 3.72 10*6/mm3      Hemoglobin 11.4 g/dL      Hematocrit 34.5 %      MCV 92.7 fL      MCH 30.6 pg      MCHC 33.0 g/dL      RDW 12.6 %      RDW-SD 42.8 fl      MPV 11.0 fL      Platelets 181 10*3/mm3      Neutrophil % 69.9 %      Lymphocyte % 24.0 %      Monocyte % 5.3 %      Eosinophil % 0.3 %      Basophil % 0.2 %      Immature Grans % 0.3 %      Neutrophils, Absolute 6.38 10*3/mm3      Lymphocytes, Absolute 2.19 10*3/mm3      Monocytes, Absolute 0.48 10*3/mm3      Eosinophils, Absolute 0.03 10*3/mm3      Basophils, Absolute 0.02 10*3/mm3      Immature Grans, Absolute 0.03 10*3/mm3      nRBC 0.0 /100 WBC     Treponema pallidum AB w/Reflex RPR [[id number]] Collected: 04/18/25 0007    Specimen: Blood Updated: 04/18/25 0022    Comprehensive Metabolic Panel [[id number]] Collected: 04/18/25 0007    Specimen: Blood Updated: 04/18/25 0022    Urinalysis, Microscopic Only - Urine, Clean Catch [[id number]]   (Abnormal) Collected: 25    Specimen: Urine, Clean Catch Updated: 25     RBC, UA 0-2 /HPF      WBC, UA 21-50 /HPF      Bacteria, UA None Seen /HPF      Squamous Epithelial Cells, UA 7-12 /HPF      Hyaline Casts, UA None Seen /LPF      Methodology Automated Microscopy    Urinalysis With Culture If Indicated - Urine, Clean Catch [[id number]]  (Abnormal) Collected: 25    Specimen: Urine, Clean Catch Updated: 25     Color, UA Yellow     Appearance, UA Clear     pH, UA 6.5     Specific Gravity, UA 1.011     Glucose, UA Negative     Ketones, UA Negative     Bilirubin, UA Negative     Blood, UA Negative     Protein, UA Negative     Leuk Esterase, UA Moderate (2+)     Nitrite, UA Negative     Urobilinogen, UA 0.2 E.U./dL    Narrative:      In absence of clinical symptoms, the presence of pyuria, bacteria, and/or nitrites on the urinalysis result does not correlate with infection.    Urine Culture - Urine, Urine, Clean Catch [[id number]] Collected: 25    Specimen: Urine, Clean Catch Updated: 25    Rapid Assay For ROM - Amniotic Fluid, [[id number]]  (Abnormal) Collected: 25    Specimen: Amniotic Fluid Updated: 25     Rupture of Membranes Positive           Radiology Review: none  Other Studies: none    Assessment & Plan       Pregnancy    Maternal anemia in pregnancy, antepartum        Assessment:  1.  Intrauterine pregnancy at 39w6d gestation with reactive fetal status.    2.  labor  with ROM  3.  Obstetrical history significant for is non-contributory.  4.  GBS status:   Strep Gp B FLAQUITO   Date Value Ref Range Status   2025 Negative Negative Final     Comment:     Centers for Disease Control and Prevention (CDC) and American Congress  of Obstetricians and Gynecologists (ACOG) guidelines for prevention of   group B streptococcal (GBS) disease specify co-collection of  a vaginal and rectal swab specimen to maximize sensitivity of  GBS  detection. Per the CDC and ACOG, swabbing both the lower vagina and  rectum substantially increases the yield of detection compared with  sampling the vagina alone.  Penicillin G, ampicillin, or cefazolin are indicated for intrapartum  prophylaxis of  GBS colonization. Reflex susceptibility  testing should be performed prior to use of clindamycin only on GBS  isolates from penicillin-allergic women who are considered a high risk  for anaphylaxis. Treatment with vancomycin without additional testing  is warranted if resistance to clindamycin is noted.         Plan:  1. fetal and uterine monitoring  continuously, expectant management, labor augmentation  Pitocin, and analgesia with  epidural  2. Plan of care has been reviewed with patient and family  3.  Anticipate       Lola Castro MD  2025  00:35 EDT

## 2025-04-19 VITALS
BODY MASS INDEX: 29.83 KG/M2 | DIASTOLIC BLOOD PRESSURE: 74 MMHG | HEIGHT: 66 IN | RESPIRATION RATE: 16 BRPM | OXYGEN SATURATION: 96 % | TEMPERATURE: 98.6 F | WEIGHT: 185.6 LBS | HEART RATE: 69 BPM | SYSTOLIC BLOOD PRESSURE: 110 MMHG

## 2025-04-19 LAB
BACTERIA SPEC AEROBE CULT: NO GROWTH
BASOPHILS # BLD AUTO: 0.04 10*3/MM3 (ref 0–0.2)
BASOPHILS NFR BLD AUTO: 0.4 % (ref 0–1.5)
DEPRECATED RDW RBC AUTO: 42 FL (ref 37–54)
EOSINOPHIL # BLD AUTO: 0.07 10*3/MM3 (ref 0–0.4)
EOSINOPHIL NFR BLD AUTO: 0.7 % (ref 0.3–6.2)
ERYTHROCYTE [DISTWIDTH] IN BLOOD BY AUTOMATED COUNT: 12.6 % (ref 12.3–15.4)
HCT VFR BLD AUTO: 31.6 % (ref 34–46.6)
HGB BLD-MCNC: 10.7 G/DL (ref 12–15.9)
IMM GRANULOCYTES # BLD AUTO: 0.02 10*3/MM3 (ref 0–0.05)
IMM GRANULOCYTES NFR BLD AUTO: 0.2 % (ref 0–0.5)
LYMPHOCYTES # BLD AUTO: 2.7 10*3/MM3 (ref 0.7–3.1)
LYMPHOCYTES NFR BLD AUTO: 27.7 % (ref 19.6–45.3)
MCH RBC QN AUTO: 31.2 PG (ref 26.6–33)
MCHC RBC AUTO-ENTMCNC: 33.9 G/DL (ref 31.5–35.7)
MCV RBC AUTO: 92.1 FL (ref 79–97)
MONOCYTES # BLD AUTO: 0.53 10*3/MM3 (ref 0.1–0.9)
MONOCYTES NFR BLD AUTO: 5.4 % (ref 5–12)
NEUTROPHILS NFR BLD AUTO: 6.37 10*3/MM3 (ref 1.7–7)
NEUTROPHILS NFR BLD AUTO: 65.6 % (ref 42.7–76)
NRBC BLD AUTO-RTO: 0 /100 WBC (ref 0–0.2)
PLATELET # BLD AUTO: 162 10*3/MM3 (ref 140–450)
PMV BLD AUTO: 10.8 FL (ref 6–12)
RBC # BLD AUTO: 3.43 10*6/MM3 (ref 3.77–5.28)
WBC NRBC COR # BLD AUTO: 9.73 10*3/MM3 (ref 3.4–10.8)

## 2025-04-19 PROCEDURE — 99202 OFFICE O/P NEW SF 15 MIN: CPT | Performed by: OBSTETRICS & GYNECOLOGY

## 2025-04-19 PROCEDURE — 85025 COMPLETE CBC W/AUTO DIFF WBC: CPT | Performed by: OBSTETRICS & GYNECOLOGY

## 2025-04-19 RX ORDER — IBUPROFEN 600 MG/1
600 TABLET, FILM COATED ORAL EVERY 8 HOURS PRN
Qty: 30 TABLET | Refills: 0 | Status: SHIPPED | OUTPATIENT
Start: 2025-04-19

## 2025-04-19 RX ORDER — ACETAMINOPHEN 325 MG/1
650 TABLET ORAL EVERY 8 HOURS PRN
Qty: 30 TABLET | Refills: 0 | Status: SHIPPED | OUTPATIENT
Start: 2025-04-19

## 2025-04-19 RX ADMIN — ACETAMINOPHEN 650 MG: 325 TABLET, FILM COATED ORAL at 08:37

## 2025-04-19 RX ADMIN — CETIRIZINE HYDROCHLORIDE 10 MG: 10 TABLET, FILM COATED ORAL at 08:37

## 2025-04-19 RX ADMIN — ACETAMINOPHEN 650 MG: 325 TABLET, FILM COATED ORAL at 16:05

## 2025-04-19 RX ADMIN — PRENATAL VITAMINS-IRON FUMARATE 27 MG IRON-FOLIC ACID 0.8 MG TABLET 1 TABLET: at 08:37

## 2025-04-19 RX ADMIN — IBUPROFEN 600 MG: 600 TABLET ORAL at 10:25

## 2025-04-19 RX ADMIN — IBUPROFEN 600 MG: 600 TABLET ORAL at 04:31

## 2025-04-19 RX ADMIN — DOCUSATE SODIUM 100 MG: 100 CAPSULE, LIQUID FILLED ORAL at 08:37

## 2025-04-19 RX ADMIN — ACETAMINOPHEN 650 MG: 325 TABLET, FILM COATED ORAL at 01:12

## 2025-04-19 NOTE — DISCHARGE SUMMARY
[facility]  Delivery Discharge Summary    Primary OB Clinician:     EDC: Estimated Date of Delivery: 25    Gestational Age:39w6d    Antepartum complications/problem list:   Patient Active Problem List   Diagnosis    Migraine    Pregnancy    Prenatal care in third trimester    Maternal anemia in pregnancy, antepartum     (normal spontaneous vaginal delivery)        Date of Delivery: 2025  Time of Delivery: 8:49 AM    Delivered By:  Erlinda Roman    Delivery Type: Vaginal, Spontaneous     Tubal Ligation: n/a    Baby:female infant;   Apgar:  8  @ 1 minute /   Apgar:  9  @ 5 minutes   Weight: 3340 g (7 lb 5.8 oz)   Length: 20.5    Anesthesia: Epidural     Intrapartum complications: None    Laceration: Yes  Laceration Information  Laceration Repaired?   Perineal: 2nd Yes   Periurethral:       Labial:       Sulcus:       Vaginal:       Cervical:                 Placenta: Spontaneous    Feeding method: Breastfeeding Status: Yes    Rh Immune globulin given: not applicable, O+    Rubella vaccine given: not applicable, Immune    Discharge Date: 2025; Discharge Time: 07:19 EDT      Plan:      Follow-up appointment with Dr. Orta in 3 weeks.

## 2025-04-19 NOTE — PROGRESS NOTES
"[facility]  Vaginal Delivery Progress Note    Subjective   Postpartum Day 1: Vaginal Delivery    The patient feels well.  Her pain is well controlled.   She is ambulating well.  Patient describes her bleeding as staining only.    Breastfeeding: infant latching.    ROS:  Neuro: neg for headache  Pulm: neg for soa  CV: neg for chest pain  : neg for heavy bleeding  Musculoskeletal: neg for leg pain    Objective     Vital Signs Range for the last 24 hours  Temperature: Temp:  [97.6 °F (36.4 °C)-98.7 °F (37.1 °C)] 98.7 °F (37.1 °C)   Temp Source: Temp src: Oral   BP: BP: ()/(56-86) 112/66   Pulse: Heart Rate:  [65-93] 73   Respirations: Resp:  [16-17] 16   SPO2: SpO2:  [96 %-100 %] 96 %   O2 Amount (l/min):     O2 Devices     Weight:       Admit Height:  Height: 167.6 cm (66\")      Physical Exam:  General:  no acute distress.  Abdomen: Fundus: appropriate, firm, non tender  Extremities: Bilateral lower ext with trace edema, no cords or tenderness.       Lab results reviewed:    Lab Results   Component Value Date    WBC 9.73 04/19/2025    HGB 10.7 (L) 04/19/2025    HCT 31.6 (L) 04/19/2025    MCV 92.1 04/19/2025     04/19/2025     Rubella:  record --    Rubella Antibodies, IgG   Date Value Ref Range Status   08/19/2024 4.25 Immune >0.99 index Final     Comment:                                     Non-immune       <0.90                                  Equivocal  0.90 - 0.99                                  Immune           >0.99       Rh Status:    RH type   Date Value Ref Range Status   04/18/2025 Positive  Final     Rh Factor   Date Value Ref Range Status   08/19/2024 Positive  Final     Comment:     Please note: Prior records for this patient's ABO / Rh type are not  available for additional verification.       Immunizations:   Immunization History   Administered Date(s) Administered    COVID-19 (PFIZER) Purple Cap Monovalent 09/13/2021, 10/04/2021    Fluzone  >6mos 10/07/2024    Fluzone (or Fluarix & " Flulaval for VFC) >6mos 2021, 12/10/2022    Tdap 2022, 2025       Assessment & Plan       Pregnancy    Maternal anemia in pregnancy, antepartum     (normal spontaneous vaginal delivery)      Mo DOWNS Matthew is Day 1  post-partum  Vaginal, Spontaneous : pt is doing well and requests discharge home today. Discharge instructions reviewed.     Plan:  Discharge home with standard precautions and return to clinic in 3 weeks.      Dinah Esteban MD  2025  07:12 EDT

## 2025-04-20 ENCOUNTER — MATERNAL SCREENING (OUTPATIENT)
Dept: CALL CENTER | Facility: HOSPITAL | Age: 35
End: 2025-04-20
Payer: COMMERCIAL

## 2025-04-20 NOTE — OUTREACH NOTE
Maternal Screening Survey      Flowsheet Row Responses   Eligibility Eligible   Prep survey completed? Yes   Facility patient discharged from? [facility] - Registered Nurse

## 2025-04-30 ENCOUNTER — MATERNAL SCREENING (OUTPATIENT)
Dept: CALL CENTER | Facility: HOSPITAL | Age: 35
End: 2025-04-30
Payer: COMMERCIAL

## 2025-04-30 NOTE — OUTREACH NOTE
Maternal Screening Survey      Flowsheet Row Respons[facility]   [facility]   [facility]? Yes   Call start time 915   Call end time 917   I have been able to laugh and see the funny side of things. 0   I have looked forward with enjoyment to things. 0   I have blamed myself unnecessarily when things went wrong. 0   I have been anxious or worried for no good reason. 0   I have felt scared or panicky for no good reason. 0   Things have been getting on top of me. 0   I have been so unhappy that I have had difficulty sleeping. 0   I have felt sad or miserable. 0   I have been so unhappy that I have been crying. 0   The thought of harming myself has occurred to me. 0   [place]  Depression Scale Total 0   Did any of your parents have problems with alcohol or drug use? No   Do any of your peers have problems with alcohol or drug use? No   Does your partner have problems with alcohol or drug use? No   Before you were pregnant did you have problems with alcohol or drug use? (past) No   In the past month, did you drink beer, wine, liquor or use any other drugs? (pregnancy) No   Maternal Screening call completed Yes   Call end time 917              Joann JONES - Registered Nurse

## 2025-05-15 ENCOUNTER — POSTPARTUM VISIT (OUTPATIENT)
Dept: OBSTETRICS AND GYNECOLOGY | Age: 35
End: 2025-05-15
Payer: COMMERCIAL

## 2025-05-15 VITALS
WEIGHT: 165 LBS | SYSTOLIC BLOOD PRESSURE: 110 MMHG | DIASTOLIC BLOOD PRESSURE: 68 MMHG | HEIGHT: 66 IN | BODY MASS INDEX: 26.52 KG/M2

## 2025-05-15 PROBLEM — Z34.93 PRENATAL CARE IN THIRD TRIMESTER: Status: RESOLVED | Noted: 2025-03-27 | Resolved: 2025-05-15

## 2025-05-15 PROBLEM — Z34.90 PREGNANCY: Status: RESOLVED | Noted: 2024-09-09 | Resolved: 2025-05-15

## 2025-05-15 RX ORDER — ACETAMINOPHEN AND CODEINE PHOSPHATE 120; 12 MG/5ML; MG/5ML
1 SOLUTION ORAL DAILY
Qty: 90 TABLET | Refills: 3 | Status: SHIPPED | OUTPATIENT
Start: 2025-05-15 | End: 2026-05-15

## 2025-05-15 NOTE — PROGRESS NOTES
"  Chief complaint-postpartum    History of present illness- Patient is a 35 y.o.  who is about 4 weeks postpartum.  Patient had a vaginal delivery.  She is here with her daughter in .  They are doing well.  Patient is breast-feeding.  She plans to do progesterone only pill.  She would like to delay pelvic exam for few weeks.  No depression.  She has lost 20 pounds.        /68   Ht 167.6 cm (66\")   Wt 74.8 kg (165 lb)   Breastfeeding Yes   BMI 26.63 kg/m²   Physical Exam  Constitutional:       General: She is not in acute distress.  Neurological:      Mental Status: She is alert.   Psychiatric:         Mood and Affect: Mood normal.         Thought Content: Thought content normal.         Judgment: Judgment normal.             Diagnoses and all orders for this visit:    1. Routine postpartum follow-up (Primary)    Other orders  -     norethindrone (MICRONOR) 0.35 MG tablet; Take 1 tablet by mouth Daily.  Dispense: 90 tablet; Refill: 3    Patient will return in 2 to 3 weeks for pelvic exam.  She will need Pap smear at that time  Progesterone only pill to start in about 2 weeks  Continue breast-feeding.  "

## 2025-06-05 ENCOUNTER — POSTPARTUM VISIT (OUTPATIENT)
Dept: OBSTETRICS AND GYNECOLOGY | Age: 35
End: 2025-06-05
Payer: COMMERCIAL

## 2025-06-05 VITALS
SYSTOLIC BLOOD PRESSURE: 108 MMHG | BODY MASS INDEX: 26.2 KG/M2 | HEIGHT: 66 IN | WEIGHT: 163 LBS | DIASTOLIC BLOOD PRESSURE: 74 MMHG

## 2025-06-05 DIAGNOSIS — Z01.419 WELL FEMALE EXAM WITH ROUTINE GYNECOLOGICAL EXAM: Primary | ICD-10-CM

## 2025-06-05 DIAGNOSIS — Z11.51 SCREENING FOR HUMAN PAPILLOMAVIRUS (HPV): ICD-10-CM

## 2025-06-05 DIAGNOSIS — Z12.4 SCREENING FOR MALIGNANT NEOPLASM OF CERVIX: ICD-10-CM

## 2025-06-05 PROBLEM — O99.019 MATERNAL ANEMIA IN PREGNANCY, ANTEPARTUM: Status: RESOLVED | Noted: 2025-04-18 | Resolved: 2025-06-05

## 2025-06-05 RX ORDER — ACETAMINOPHEN AND CODEINE PHOSPHATE 120; 12 MG/5ML; MG/5ML
1 SOLUTION ORAL DAILY
Qty: 84 TABLET | Refills: 3 | Status: SHIPPED | OUTPATIENT
Start: 2025-06-05 | End: 2026-06-05

## 2025-06-05 NOTE — PROGRESS NOTES
"Subjective   oM Castro is a 35 y.o. female who presents for a postpartum visit. She is 6 weeks postpartum following a spontaneous vaginal delivery. I have fully reviewed the prenatal and intrapartum course. Postpartum course has been uneventful. Baby is feeding by breast. Bleeding has been normal in amount and decreasing. Bowel function is normal. Bladder function is normal. Patient not sexually active at this time. Contraception method is discussed. Postpartum depression screening: negative.  Daughter's name is Holley    The following portions of the patient's history were reviewed and updated as appropriate: allergies, current medications,and problem list.    Review of Systems  Pertinent items are noted in HPI.    Objective   /74   Ht 167.6 cm (66\")   Wt 73.9 kg (163 lb)   Breastfeeding Yes   BMI 26.31 kg/m²    General:  Alert and oriented, NAD    Breasts:         Heart:     Abdomen: Normal findings, nontender    Vulva: Normal, well-healed    Vagina: No lesions or abnormal discharge   Cervix:  Normal with no cervical motion tenderness   Corpus: Normal for post partum visit   Adnexa:  Non tender, non enlarged         Assessment & Plan     Normal postpartum exam. Pap smear done at today's visit.    1. Contraception: POP   2. Slow return to normal activities reviewed. Continue prenatal vitamins.  3. Follow up in 12 months or sooner as needed.           "

## 2025-06-09 LAB
CYTOLOGIST CVX/VAG CYTO: NORMAL
CYTOLOGY CVX/VAG DOC CYTO: NORMAL
CYTOLOGY CVX/VAG DOC THIN PREP: NORMAL
DX ICD CODE: NORMAL
HPV I/H RISK 4 DNA CVX QL PROBE+SIG AMP: NEGATIVE
OTHER STN SPEC: NORMAL
SERVICE CMNT-IMP: NORMAL
STAT OF ADQ CVX/VAG CYTO-IMP: NORMAL

## 2025-06-27 ENCOUNTER — TELEPHONE (OUTPATIENT)
Dept: OBSTETRICS AND GYNECOLOGY | Age: 35
End: 2025-06-27

## 2025-06-27 NOTE — TELEPHONE ENCOUNTER
Caller: Mo Castro    Relationship: Self    Best call back number:[phone]      Who are you requesting to speak with (clinical staff, DR. DILLARD    What was the call regarding: PATIENT IS CURRENTLY ON MATERNITY LEAVE, NEEDS A NOTE TO RETURN TO WORK ON 7/15/25,  PLEASE UPLOAD TO  [facility].    Is it okay if the provider responds through [facility]: YES

## 2025-07-07 ENCOUNTER — TELEPHONE (OUTPATIENT)
Dept: OBSTETRICS AND GYNECOLOGY | Age: 35
End: 2025-07-07
Payer: COMMERCIAL